# Patient Record
Sex: FEMALE | Race: WHITE | NOT HISPANIC OR LATINO | Employment: FULL TIME | URBAN - METROPOLITAN AREA
[De-identification: names, ages, dates, MRNs, and addresses within clinical notes are randomized per-mention and may not be internally consistent; named-entity substitution may affect disease eponyms.]

---

## 2017-08-01 ENCOUNTER — THERAPY VISIT (OUTPATIENT)
Dept: PHYSICAL THERAPY | Facility: CLINIC | Age: 47
End: 2017-08-01
Payer: COMMERCIAL

## 2017-08-01 DIAGNOSIS — M72.2 PLANTAR FASCIITIS: Primary | ICD-10-CM

## 2017-08-01 PROCEDURE — 97110 THERAPEUTIC EXERCISES: CPT | Mod: GP | Performed by: PHYSICAL THERAPIST

## 2017-08-01 PROCEDURE — 97161 PT EVAL LOW COMPLEX 20 MIN: CPT | Mod: GP | Performed by: PHYSICAL THERAPIST

## 2017-08-01 NOTE — PROGRESS NOTES
[facility] Initial Evaluation      Subjective:    Patient is a 46 year old female presenting with rehab right ankle/foot hpi.   Rand Macario is a 46 year old female with a right foot condition.  Condition occurred with:  Insidious onset.    This is a chronic condition  Pt reports 10 years of plantar fasciits which is usually managed with good footwear.  More recently 10 months ago (Oct 2016) it began to hurt more.  Started to ice, stretch but didn't help.  Three cortisone shots over the past 10 months help for a few weeks.  Recently stopped taking an arthritis medication that rx by the podiatrist for the pain (Meloxicam) and sx worsened.  Then recommended to try PT.  .    Patient reports pain:  Posterior.    Pain is described as aching, burning and sharp and is intermittent and reported as 8/10.  Associated symptoms:  Loss of motion/stiffness. Pain is the same all the time.  Symptoms are exacerbated by standing and walking (inactivity) and relieved by nothing.  Since onset symptoms are gradually worsening.  Special tests:  MRI (MRI basically negative, possibly some mild spurring).  Previous treatment includes other (stretching and icing, injections, walking boot).  There was mild and moderate improvement following previous treatment.  General health as reported by patient is fair.  Pertinent medical history includes:  Diabetes, high blood pressure and overweight.  Medical allergies: no.  Other surgeries include:  Other (hysterectomy).  Current medications:  Other and high blood pressure medication (cholesterol and metforming).  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  Patient is working in normal job without restrictions.  Primary job tasks include:  Prolonged sitting.    Barriers include:  None as reported by patient.    Red flags:  None as reported by patient.                        Objective:    Standing Alignment:                Ankle/Foot:  Pes planus L and pes planus R    Gait:       Deviations:  General Deviations:  Toe out R and stance time decr          Ankle/Foot Evaluation  ROM:    AROM:    Dorsiflexion:  Left:   13  Right:   2                    PALPATION: Palpation of ankle: R lateral calcaneus.                                                                                               Musculoskeletal:        Legs:      ROS    Assessment/Plan:      Patient is a 46 year old female with right side ankle complaints.    Patient has the following significant findings with corresponding treatment plan.                Diagnosis 1:  R plantar fasciitis/fasciosis  Pain -  manual therapy, self management, education, directional preference exercise and home program  Decreased ROM/flexibility - manual therapy, therapeutic exercise, therapeutic activity and home program  Decreased joint mobility - manual therapy, therapeutic exercise, therapeutic activity and home program  Inflammation - self management/home program  Decreased function - therapeutic activities and home program    Therapy Evaluation Codes:   1) History comprised of:   Personal factors that impact the plan of care:      Time since onset of symptoms.    Comorbidity factors that impact the plan of care are:      Overweight.     Medications impacting care: None.  2) Examination of Body Systems comprised of:   Body structures and functions that impact the plan of care:      Ankle.   Activity limitations that impact the plan of care are:      Standing and Walking.  3) Clinical presentation characteristics are:   Evolving/Changing.  4) Decision-Making    Low  Cumulative Therapy Evaluation is: Low complexity.    Previous and current functional limitations:  (See Goal Flow Sheet for this information)    Short term and Long term goals: (See Goal Flow Sheet for this information)     Communication ability:  Patient appears to be able to clearly communicate and understand verbal and written communication and follow directions  correctly.  Treatment Explanation - The following has been discussed with the patient:   RX ordered/plan of care  Anticipated outcomes  Possible risks and side effects  This patient would benefit from PT intervention to resume normal activities.   Rehab potential is good.    Frequency:  2 X week, once daily  Duration:  for 3 weeks  Discharge Plan:  Achieve all LTG.  Independent in home treatment program.  Reach maximal therapeutic benefit.    Please refer to the daily flowsheet for treatment today, total treatment time and time spent performing 1:1 timed codes.

## 2017-08-01 NOTE — LETTER
[facility]  [address]  [place] [address] [phone]    2017    Re: Rand Macario   :   1970  MRN:  [id number]   REFERRING PHYSICIAN:   Corinne Penny    [facility]  Date of Initial Evaluation:  2017  Visits:  Rxs Used: 1  Reason for Referral:  Plantar fasciitis  EVALUATION SUMMARY  [facility] Initial Evaluation  Subjective:  Patient is a 46 year old female presenting with rehab right ankle/foot hpi.   Rand Macario is a 46 year old female with a right foot condition.  Condition occurred with:  Insidious onset.    This is a chronic condition  Pt reports 10 years of plantar fasciits which is usually managed with good footwear.  More recently 10 months ago (Oct 2016) it began to hurt more.  Started to ice, stretch but didn't help.  Three cortisone shots over the past 10 months help for a few weeks.  Recently stopped taking an arthritis medication that rx by the podiatrist for the pain (Meloxicam) and sx worsened.  Then recommended to try PT.  .    Patient reports pain:  Posterior.    Pain is described as aching, burning and sharp and is intermittent and reported as 8/10.  Associated symptoms:  Loss of motion/stiffness. Pain is the same all the time.  Symptoms are exacerbated by standing and walking (inactivity) and relieved by nothing.  Since onset symptoms are gradually worsening.  Special tests:  MRI (MRI basically negative, possibly some mild spurring).  Previous treatment includes other (stretching and icing, injections, walking boot).  There was mild and moderate improvement following previous treatment.  General health as reported by patient is fair.  Pertinent medical history includes:  Diabetes, high blood pressure and overweight.  Medical allergies: no.  Other surgeries include:  Other (hysterectomy).  Current medications:  Other and high  blood pressure medication (cholesterol and metforming).  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  Patient is working in normal job without restrictions.  Primary job tasks include:  Prolonged sitting.  Barriers include:  None as reported by patient.  Red flags:  None as reported by patient.  Objective:  Standing Alignment:    Ankle/Foot:  Pes planus L and pes planus R  Gait:    Deviations:  General Deviations:  Toe out R and stance time decr  Ankle/Foot Evaluation  ROM:    AROM:    Dorsiflexion:  Left:   13  Right:   2  PALPATION: Palpation of ankle: R lateral calcaneus.                                       Musculoskeletal:        Legs:      Assessment/Plan:      Patient is a 46 year old female with right side ankle complaints.    Patient has the following significant findings with corresponding treatment plan.                Diagnosis 1:  R plantar fasciitis/fasciosis  Pain -  manual therapy, self management, education, directional preference exercise and home program  Decreased ROM/flexibility - manual therapy, therapeutic exercise, therapeutic activity and home program  Decreased joint mobility - manual therapy, therapeutic exercise, therapeutic activity and home program  Inflammation - self management/home program  Decreased function - therapeutic activities and home program    Therapy Evaluation Codes:   1) History comprised of:   Personal factors that impact the plan of care:      Time since onset of symptoms.    Comorbidity factors that impact the plan of care are:      Overweight.     Medications impacting care: None.  2) Examination of Body Systems comprised of:   Body structures and functions that impact the plan of care:      Ankle.   Activity limitations that impact the plan of care are:      Standing and Walking.  3) Clinical presentation characteristics are:   Evolving/Changing.  4) Decision-Making    Low  Cumulative Therapy Evaluation is: Low complexity.    Previous and current functional limitations:   (See Goal Flow Sheet for this information)    Short term and Long term goals: (See Goal Flow Sheet for this information)     Communication ability:  Patient appears to be able to clearly communicate and understand verbal and written communication and follow directions correctly.  Treatment Explanation - The following has been discussed with the patient:   RX ordered/plan of care  Anticipated outcomes  Possible risks and side effects  This patient would benefit from PT intervention to resume normal activities.   Rehab potential is good.    Frequency:  2 X week, once daily  Duration:  for 3 weeks  Discharge Plan:  Achieve all LTG.  Independent in home treatment program.  Reach maximal therapeutic benefit.    Thank you for your referral.    INQUIRIES  Therapist: Juwan Hong DPT  [facility] PHYSICAL THERAPY  [address] [place] [address]. [address]  [facility] [address]  Phone: [phone]  Fax: [phone]

## 2017-08-01 NOTE — MR AVS SNAPSHOT
After Visit Summary   8/1/2017    Rand Macario    MRN: [id number]           Patient Information     Date Of Birth          1970        Visit Information        Provider Department      8/1/2017 5:20 PM Juwan Hong, PT [facility] Physical [facility]        Today's Diagnoses     Plantar fasciitis    -  1       Follow-ups after your visit        Your next 10 appointments already scheduled     Aug 04, 2017  4:30 PM CDT   OKNG Extremity with Juwan Hong PT   [facility] Physical Therapy ([facility])    [address]  [facility] [address]   [phone]            Aug 08, 2017  4:00 PM CDT   KONG Extremity with Juwan Hong PT   [facility] Physical Therapy ([facility])    [address]  [facility] [address]   [phone]            Aug 10, 2017  3:40 PM CDT   KONG Extremity with Juwan Hong PT   [facility] Physical Therapy ([facility])    [address]  [facility] [address]   [phone]              Who to contact     If you have questions or need follow up information about today's clinic visit or your schedule please contact [facility] PHYSICAL [facility] directly at [phone].  Normal or non-critical lab and imaging results will be communicated to you by MyChart, letter or phone within 4 business days after the clinic has received the results. If you do not hear from us within 7 days, please contact the clinic through MyChart or phone. If you have a critical or abnormal lab result, we will notify you by phone as soon as possible.  Submit refill requests through Geo[employer] or call your pharmacy and they will forward the refill request to us. Please allow 3 business days for your refill to be completed.          Additional Information About Your  "Visit        Slime Sandwichhart Information     [employer] lets you send messages to your doctor, view your test results, renew your prescriptions, schedule appointments and more. To sign up, go to [email or web address] . Click on \"Log in\" on the left side of the screen, which will take you to the Welcome page. Then click on \"Sign up Now\" on the right side of the page.     You will be asked to enter the access code listed below, as well as some personal information. Please follow the directions to create your username and password.     Your access code is: [email or web address]  Expires: 10/30/2017  6:50 PM     Your access code will  in 90 days. If you need help or a new code, please call your [place] clinic or [phone].        Care EveryWhere ID     This is your Care EveryWhere ID. This could be used by other organizations to access your [place] medical records  [id number]         Blood Pressure from Last 3 Encounters:   No data found for BP    Weight from Last 3 Encounters:   No data found for Wt              We Performed the Following     HC PT EVAL, LOW COMPLEXITY     KONG INITIAL EVAL REPORT     THERAPEUTIC EXERCISES        Primary Care Provider    None Specified       No primary provider on file.        Equal Access to Services     CYNTHIA LOPEZ : Epi De La Rosa, domingo dyer, rosario michel, marie hicks. So [facility] [phone].    ATENCIÓN: Si habla español, tiene a asif disposición servicios gratuitos de asistencia lingüística. Llame al [phone].    We comply with applicable federal civil rights laws and [place] laws. We do not discriminate on the basis of race, color, national origin, age, disability sex, sexual orientation or gender identity.            Thank you!     Thank you for choosing [facility] PHYSICAL THERAPY  for your care. Our goal is always to provide you with excellent care. Hearing back from our patients is " one way we can continue to improve our services. Please take a few minutes to complete the written survey that you may receive in the mail after your visit with us. Thank you!             Your Updated Medication List - Protect others around you: Learn how to safely use, store and throw away your medicines at www.disposemymeds.org.      Notice  As of 8/1/2017  6:50 PM    You have not been prescribed any medications.

## 2017-08-04 ENCOUNTER — THERAPY VISIT (OUTPATIENT)
Dept: PHYSICAL THERAPY | Facility: CLINIC | Age: 47
End: 2017-08-04
Payer: COMMERCIAL

## 2017-08-04 DIAGNOSIS — M72.2 PLANTAR FASCIITIS: ICD-10-CM

## 2017-08-04 PROCEDURE — 97140 MANUAL THERAPY 1/> REGIONS: CPT | Mod: GP | Performed by: PHYSICAL THERAPIST

## 2017-08-04 PROCEDURE — 97110 THERAPEUTIC EXERCISES: CPT | Mod: GP | Performed by: PHYSICAL THERAPIST

## 2017-08-04 PROCEDURE — 97035 APP MDLTY 1+ULTRASOUND EA 15: CPT | Mod: GP | Performed by: PHYSICAL THERAPIST

## 2017-08-08 ENCOUNTER — THERAPY VISIT (OUTPATIENT)
Dept: PHYSICAL THERAPY | Facility: CLINIC | Age: 47
End: 2017-08-08
Payer: COMMERCIAL

## 2017-08-08 DIAGNOSIS — M72.2 PLANTAR FASCIITIS: ICD-10-CM

## 2017-08-08 PROCEDURE — 97140 MANUAL THERAPY 1/> REGIONS: CPT | Mod: GP | Performed by: PHYSICAL THERAPIST

## 2017-08-08 PROCEDURE — 97035 APP MDLTY 1+ULTRASOUND EA 15: CPT | Mod: GP | Performed by: PHYSICAL THERAPIST

## 2017-08-08 NOTE — PROGRESS NOTES
Subjective:    HPI                    Objective:    System    Physical Exam    General     ROS    Assessment/Plan:      SUBJECTIVE  Subjective changes as noted by pt:  Day after last session was best day she's had in year.  Continues her perform her stretches.       Current pain level: 5/10     Changes in function:  None     Adverse reaction to treatment or activity:  None    OBJECTIVE  Changes in objective findings:  Yes, R ankle DF AROM 12 deg.          ASSESSMENT  Rand continues to require intervention to meet STG and LTG's: PT  Patient is progressing as expected.  Response to therapy has shown an improvement in  pain level  Progress made towards STG/LTG?  None    PLAN  Continue current treatment plan until patient demonstrates readiness to progress to higher level exercises.    PTA/ATC plan:  N/A    Please refer to the daily flowsheet for treatment today, total treatment time and time spent performing 1:1 timed codes.

## 2017-08-08 NOTE — MR AVS SNAPSHOT
After Visit Summary   8/8/2017    Rand Macario    MRN: [id number]           Patient Information     Date Of Birth          1970        Visit Information        Provider Department      8/8/2017 4:00 PM Juwan Hong, PT [facility]        Today's Diagnoses     Plantar fasciitis           Follow-ups after your visit        Your next 10 appointments already scheduled     Aug 10, 2017  3:40 PM CDT   KONG Extremity with Juwan Hong PT   [facility] ([facility])    [address]  [facility] [address]   [phone]            Aug 15, 2017  4:00 PM CDT   KONG Extremity with Juwan Hong PT   [facility] ([facility])    [address]  [facility] [address]   [phone]            Aug 18, 2017  3:50 PM CDT   KONG Extremity with Juwan Hong PT   [facility] ([facility])    [address]  [facility] [address]   [phone]              Who to contact     If you have questions or need follow up information about today's clinic visit or your schedule please contact [facility] PHYSICAL [facility] directly at [phone].  Normal or non-critical lab and imaging results will be communicated to you by MyChart, letter or phone within 4 business days after the clinic has received the results. If you do not hear from us within 7 days, please contact the clinic through MyChart or phone. If you have a critical or abnormal lab result, we will notify you by phone as soon as possible.  Submit refill requests through [employer] or call your pharmacy and they will forward the refill request to us. Please allow 3 business days for your refill to be completed.          Additional Information About Your  "Visit        Caller[employer]hart Information     [employer] lets you send messages to your doctor, view your test results, renew your prescriptions, schedule appointments and more. To sign up, go to [email or web address] . Click on \"Log in\" on the left side of the screen, which will take you to the Welcome page. Then click on \"Sign up Now\" on the right side of the page.     You will be asked to enter the access code listed below, as well as some personal information. Please follow the directions to create your username and password.     Your access code is: [email or web address]  Expires: 10/30/2017  6:50 PM     Your access code will  in 90 days. If you need help or a new code, please call your [place] clinic or [phone].        Care EveryWhere ID     This is your Care EveryWhere ID. This could be used by other organizations to access your [place] medical records  [id number]         Blood Pressure from Last 3 Encounters:   No data found for BP    Weight from Last 3 Encounters:   No data found for Wt              We Performed the Following     MANUAL THER TECH,1+REGIONS,EA 15 MIN     ULTRASOUND THERAPY        Primary Care Provider    None Specified       No primary provider on file.        Equal Access to Services     ANGEL LOPEZ : Hadii hakan De La Rosa, wajodee dyer, qaaleksey kaalmada anand, marie cohen . So [facility] [place] [facility] [phone].    ATENCIÓN: Si habla español, tiene a asif disposición servicios gratuitos de asistencia lingüística. Llame al [phone].    We comply with applicable federal civil rights laws and [place] laws. We do not discriminate on the basis of race, color, national origin, age, disability sex, sexual orientation or gender identity.            Thank you!     Thank you for choosing [facility] PHYSICAL THERAPY  for your care. Our goal is always to provide you with excellent care. Hearing back from our patients is one way we can " continue to improve our services. Please take a few minutes to complete the written survey that you may receive in the mail after your visit with us. Thank you!             Your Updated Medication List - Protect others around you: Learn how to safely use, store and throw away your medicines at www.disposemymeds.org.      Notice  As of 8/8/2017  4:41 PM    You have not been prescribed any medications.

## 2017-08-10 ENCOUNTER — THERAPY VISIT (OUTPATIENT)
Dept: PHYSICAL THERAPY | Facility: CLINIC | Age: 47
End: 2017-08-10
Payer: COMMERCIAL

## 2017-08-10 DIAGNOSIS — M72.2 PLANTAR FASCIITIS: ICD-10-CM

## 2017-08-10 PROCEDURE — 97035 APP MDLTY 1+ULTRASOUND EA 15: CPT | Mod: GP | Performed by: PHYSICAL THERAPIST

## 2017-08-10 PROCEDURE — 97140 MANUAL THERAPY 1/> REGIONS: CPT | Mod: GP | Performed by: PHYSICAL THERAPIST

## 2017-08-15 ENCOUNTER — THERAPY VISIT (OUTPATIENT)
Dept: PHYSICAL THERAPY | Facility: CLINIC | Age: 47
End: 2017-08-15
Payer: COMMERCIAL

## 2017-08-15 DIAGNOSIS — M72.2 PLANTAR FASCIITIS: ICD-10-CM

## 2017-08-15 PROCEDURE — 97140 MANUAL THERAPY 1/> REGIONS: CPT | Mod: GP | Performed by: PHYSICAL THERAPIST

## 2017-08-15 PROCEDURE — 97035 APP MDLTY 1+ULTRASOUND EA 15: CPT | Mod: GP | Performed by: PHYSICAL THERAPIST

## 2017-08-15 NOTE — MR AVS SNAPSHOT
"              After Visit Summary   8/15/2017    Rand Macario    MRN: [id number]           Patient Information     Date Of Birth          1970        Visit Information        Provider Department      8/15/2017 4:00 PM Ju[facility], [facility]        Today's Diagnoses     Plantar fasciitis           Follow-ups after your visit        Your next 10 appointments already scheduled     Aug 18, 2017  3:50 PM CDT   KONG Extremity with Juwan Hong PT   [facility] Physical Therapy ([facility])    [address]  [facility] [address] [phone]              Who to contact     If you have questions or need follow up information about today's clinic visit or your schedule please contact [facility] PHYSICAL [facility] directly at [phone].  Normal or non-critical lab and imaging results will be communicated to you by Clowdyhart, letter or phone within 4 business days after the clinic has received the results. If you do not hear from us within 7 days, please contact the clinic through Clowdyhart or phone. If you have a critical or abnormal lab result, we will notify you by phone as soon as possible.  Submit refill requests through Yobongo or call your pharmacy and they will forward the refill request to us. Please allow 3 business days for your refill to be completed.          Additional Information About Your Visit        MyChart Information     Yobongo lets you send messages to your doctor, view your test results, renew your prescriptions, schedule appointments and more. To sign up, go to [email or web address] . Click on \"Log in\" on the left side of the screen, which will take you to the Welcome page. Then click on \"Sign up Now\" on the right side of the page.     You will be asked to enter the access code listed below, as well as some personal information. Please " follow the directions to create your username and password.     Your access code is: [email or web address]  Expires: 10/30/2017  6:50 PM     Your access code will  in 90 days. If you need help or a new code, please call your [place] clinic or [phone].        Care EveryWhere ID     This is your Care EveryWhere ID. This could be used by other organizations to access your [place] medical records  [id number]         Blood Pressure from Last 3 Encounters:   No data found for BP    Weight from Last 3 Encounters:   No data found for Wt              We Performed the Following     MANUAL THER TECH,1+REGIONS,EA 15 MIN     ULTRASOUND THERAPY        Primary Care Provider    None Specified       No primary provider on file.        Equal Access to Services     ANGEL [facility] : Epi De La Rosa, domingo dyer, rosario michel, marie cohen . So [facility] [phone].    ATENCIÓN: Si habla español, tiene a asif disposición servicios gratuitos de asistencia lingüística. Llame al [phone].    We comply with applicable federal civil rights laws and [place] laws. We do not discriminate on the basis of race, color, national origin, age, disability sex, sexual orientation or gender identity.            Thank you!     Thank you for choosing [facility] PHYSICAL THERAPY  for your care. Our goal is always to provide you with excellent care. Hearing back from our patients is one way we can continue to improve our services. Please take a few minutes to complete the written survey that you may receive in the mail after your visit with us. Thank you!             Your Updated Medication List - Protect others around you: Learn how to safely use, store and throw away your medicines at www.disposemymeds.org.      Notice  As of 8/15/2017  4:39 PM    You have not been prescribed any medications.

## 2017-08-18 ENCOUNTER — THERAPY VISIT (OUTPATIENT)
Dept: PHYSICAL THERAPY | Facility: CLINIC | Age: 47
End: 2017-08-18
Payer: COMMERCIAL

## 2017-08-18 DIAGNOSIS — M72.2 PLANTAR FASCIITIS: ICD-10-CM

## 2017-08-18 PROCEDURE — 97035 APP MDLTY 1+ULTRASOUND EA 15: CPT | Mod: GP | Performed by: PHYSICAL THERAPIST

## 2017-08-18 PROCEDURE — 97140 MANUAL THERAPY 1/> REGIONS: CPT | Mod: GP | Performed by: PHYSICAL THERAPIST

## 2017-08-21 ENCOUNTER — THERAPY VISIT (OUTPATIENT)
Dept: PHYSICAL THERAPY | Facility: CLINIC | Age: 47
End: 2017-08-21
Payer: COMMERCIAL

## 2017-08-21 DIAGNOSIS — M72.2 PLANTAR FASCIITIS: ICD-10-CM

## 2017-08-21 PROCEDURE — 97035 APP MDLTY 1+ULTRASOUND EA 15: CPT | Mod: GP | Performed by: PHYSICAL THERAPIST

## 2017-08-21 PROCEDURE — 97140 MANUAL THERAPY 1/> REGIONS: CPT | Mod: GP | Performed by: PHYSICAL THERAPIST

## 2017-08-21 NOTE — MR AVS SNAPSHOT
"              After Visit Summary   8/21/2017    Rand Macario    MRN: [id number]           Patient Information     Date Of Birth          1970        Visit Information        Provider Department      8/21/2017 9:30 AM Juwan Hong, PT [facility] Physical Therapy        Today's Diagnoses     Plantar fasciitis           Follow-ups after your visit        Your next 10 appointments already scheduled     Aug 25, 2017 10:20 AM CDT   KONG Extremity with Juwan Hong PT   [facility] Physical Therapy ([facility])    [address]  [facility] [id number] [phone]              Who to contact     If you have questions or need follow up information about today's clinic visit or your schedule please contact [facility] PHYSICAL [facility] directly at [phone].  Normal or non-critical lab and imaging results will be communicated to you by WiLinxhart, letter or phone within 4 business days after the clinic has received the results. If you do not hear from us within 7 days, please contact the clinic through WiLinxhart or phone. If you have a critical or abnormal lab result, we will notify you by phone as soon as possible.  Submit refill requests through Fancorps or call your pharmacy and they will forward the refill request to us. Please allow 3 business days for your refill to be completed.          Additional Information About Your Visit        MyChart Information     Fancorps lets you send messages to your doctor, view your test results, renew your prescriptions, schedule appointments and more. To sign up, go to [email or web address] . Click on \"Log in\" on the left side of the screen, which will take you to the Welcome page. Then click on \"Sign up Now\" on the right side of the page.     You will be asked to enter the access code listed below, as well as some personal information. Please " follow the directions to create your username and password.     Your access code is: [email or web address]  Expires: 10/30/2017  6:50 PM     Your access code will  in 90 days. If you need help or a new code, please call your [place] clinic or [phone].        Care EveryWhere ID     This is your Care EveryWhere ID. This could be used by other organizations to access your [place] medical records  [id number]         Blood Pressure from Last 3 Encounters:   No data found for BP    Weight from Last 3 Encounters:   No data found for Wt              We Performed the Following     MANUAL THER TECH,1+REGIONS,EA 15 MIN     ULTRASOUND THERAPY        Primary Care Provider    None Specified       No primary provider on file.        Equal Access to Services     [facility]EL [facility] : Epi De La Rosa, domingo dyer, rosario michel, marie cohen . So [facility] [phone].    ATENCIÓN: Si habla español, tiene a asif disposición servicios gratuitos de asistencia lingüística. Llame al [phone].    We comply with applicable federal civil rights laws and [place] laws. We do not discriminate on the basis of race, color, national origin, age, disability sex, sexual orientation or gender identity.            Thank you!     Thank you for choosing [facility] PHYSICAL THERAPY  for your care. Our goal is always to provide you with excellent care. Hearing back from our patients is one way we can continue to improve our services. Please take a few minutes to complete the written survey that you may receive in the mail after your visit with us. Thank you!             Your Updated Medication List - Protect others around you: Learn how to safely use, store and throw away your medicines at www.disposemymeds.org.      Notice  As of 2017 10:07 AM    You have not been prescribed any medications.

## 2017-08-21 NOTE — PROGRESS NOTES
Subjective:    HPI                    Objective:    System    Physical Exam    General     ROS    Assessment/Plan:      SUBJECTIVE  Subjective changes as noted by pt:  Feels better with night splint but only comfortable for half the night.  Still on right track. Overall 75% to where she wants to be.        Current pain level: 5/10     Changes in function:  None     Adverse reaction to treatment or activity:  None    OBJECTIVE  Changes in objective findings:  None.          ASSESSMENT  Rand continues to require intervention to meet STG and LTG's: PT  Patient is progressing as expected.  Response to therapy has shown an improvement in  pain level  Progress made towards STG/LTG?  None    PLAN  Continue current treatment plan until patient demonstrates readiness to progress to higher level exercises.    PTA/ATC plan:  N/A    Please refer to the daily flowsheet for treatment today, total treatment time and time spent performing 1:1 timed codes.

## 2017-08-25 ENCOUNTER — THERAPY VISIT (OUTPATIENT)
Dept: PHYSICAL THERAPY | Facility: CLINIC | Age: 47
End: 2017-08-25
Payer: COMMERCIAL

## 2017-08-25 DIAGNOSIS — M72.2 PLANTAR FASCIITIS: ICD-10-CM

## 2017-08-25 PROCEDURE — 97035 APP MDLTY 1+ULTRASOUND EA 15: CPT | Mod: GP | Performed by: PHYSICAL THERAPIST

## 2017-08-25 PROCEDURE — 97140 MANUAL THERAPY 1/> REGIONS: CPT | Mod: GP | Performed by: PHYSICAL THERAPIST

## 2017-08-31 ENCOUNTER — THERAPY VISIT (OUTPATIENT)
Dept: PHYSICAL THERAPY | Facility: CLINIC | Age: 47
End: 2017-08-31
Payer: COMMERCIAL

## 2017-08-31 DIAGNOSIS — M72.2 PLANTAR FASCIITIS: ICD-10-CM

## 2017-08-31 PROCEDURE — 97140 MANUAL THERAPY 1/> REGIONS: CPT | Mod: GP | Performed by: PHYSICAL THERAPIST

## 2017-08-31 PROCEDURE — 97035 APP MDLTY 1+ULTRASOUND EA 15: CPT | Mod: GP | Performed by: PHYSICAL THERAPIST

## 2017-08-31 NOTE — PROGRESS NOTES
Subjective:    HPI                    Objective:    System    Physical Exam    General     ROS    Assessment/Plan:      SUBJECTIVE  Subjective changes as noted by pt:  Pretty sore today, [place].  Not sure if that's why.   Wearing boot about 4-5 hrs/night.      Current pain level: 8/10     Changes in function:  None     Adverse reaction to treatment or activity:  None    OBJECTIVE  Changes in objective findings:  Yes, R ankle DF AROM 9        ASSESSMENT  Rand continues to require intervention to meet STG and LTG's: PT  Patient has experienced an exacerbation of symptoms.  Response to therapy has shown a worsening of  pain level  Progress made towards STG/LTG?  None    PLAN  Continue current treatment plan until patient demonstrates readiness to progress to higher level exercises.    PTA/ATC plan:  N/A    Please refer to the daily flowsheet for treatment today, total treatment time and time spent performing 1:1 timed codes.

## 2017-08-31 NOTE — MR AVS SNAPSHOT
"              After Visit Summary   8/31/2017    Rand Macario    MRN: [id number]           Patient Information     Date Of Birth          1970        Visit Information        Provider Department      8/31/2017 3:40 PM [facility]        Today's Diagnoses     Plantar fasciitis           Follow-ups after your visit        Your next 10 appointments already scheduled     Sep 05, 2017  9:50 AM CDT   KONG Extremity with Juwan Hong PT   [facility] ([facility])    [address]  [facility] [id number] [phone]            Sep 08, 2017  9:40 AM CDT   KONG Extremity with Juwan Hong PT   [facility] ([facility])    [address]  [facility] [id number] [phone]              Who to contact     If you have questions or need follow up information about today's clinic visit or your schedule please contact [facility] PHYSICAL THERAPY directly at [phone].  Normal or non-critical lab and imaging results will be communicated to you by MyChart, letter or phone within 4 business days after the clinic has received the results. If you do not hear from us within 7 days, please contact the clinic through Acoriohart or phone. If you have a critical or abnormal lab result, we will notify you by phone as soon as possible.  Submit refill requests through Glympse or call your pharmacy and they will forward the refill request to us. Please allow 3 business days for your refill to be completed.          Additional Information About Your Visit        MyChart Information     Glympse lets you send messages to your doctor, view your test results, renew your prescriptions, schedule appointments and more. To sign up, go to [email or web address] . Click on \"Log in\" on the " "left side of the screen, which will take you to the Welcome page. Then click on \"Sign up Now\" on the right side of the page.     You will be asked to enter the access code listed below, as well as some personal information. Please follow the directions to create your username and password.     Your access code is: [email or web address]  Expires: 10/30/2017  6:50 PM     Your access code will  in 90 days. If you need help or a new code, please call your [place] clinic or [phone].        Care EveryWhere ID     This is your Care EveryWhere ID. This could be used by other organizations to access your [place] medical records  [id number]         Blood Pressure from Last 3 Encounters:   No data found for BP    Weight from Last 3 Encounters:   No data found for Wt              We Performed the Following     MANUAL THER TECH,1+REGIONS,EA 15 MIN     ULTRASOUND THERAPY        Primary Care Provider    None Specified       No primary provider on file.        Equal Access to Services     [facility]EL [facility] : Hadii hakan De La Rosa, wajodee dyer, qaaleksey kaalmavinay michel, marie cohen . So [facility] [phone].    ATENCIÓN: Si snehalla jonatanañol, tiene a asif disposición servicios gratuitos de asistencia lingüística. Llame al [phone].    We comply with applicable federal civil rights laws and [place] laws. We do not discriminate on the basis of race, color, national origin, age, disability sex, sexual orientation or gender identity.            Thank you!     Thank you for choosing [facility] PHYSICAL THERAPY  for your care. Our goal is always to provide you with excellent care. Hearing back from our patients is one way we can continue to improve our services. Please take a few minutes to complete the written survey that you may receive in the mail after your visit with us. Thank you!             Your Updated Medication List - Protect others around you: Learn how " to safely use, store and throw away your medicines at www.disposemymeds.org.      Notice  As of 8/31/2017  4:22 PM    You have not been prescribed any medications.

## 2017-09-05 ENCOUNTER — THERAPY VISIT (OUTPATIENT)
Dept: PHYSICAL THERAPY | Facility: CLINIC | Age: 47
End: 2017-09-05
Payer: COMMERCIAL

## 2017-09-05 DIAGNOSIS — M72.2 PLANTAR FASCIITIS: ICD-10-CM

## 2017-09-05 PROCEDURE — 97140 MANUAL THERAPY 1/> REGIONS: CPT | Mod: GP | Performed by: PHYSICAL THERAPIST

## 2017-09-05 PROCEDURE — 97035 APP MDLTY 1+ULTRASOUND EA 15: CPT | Mod: GP | Performed by: PHYSICAL THERAPIST

## 2017-09-08 ENCOUNTER — THERAPY VISIT (OUTPATIENT)
Dept: PHYSICAL THERAPY | Facility: CLINIC | Age: 47
End: 2017-09-08
Payer: COMMERCIAL

## 2017-09-08 DIAGNOSIS — M72.2 PLANTAR FASCIITIS: ICD-10-CM

## 2017-09-08 PROCEDURE — 97140 MANUAL THERAPY 1/> REGIONS: CPT | Mod: GP | Performed by: PHYSICAL THERAPIST

## 2017-09-08 PROCEDURE — 97035 APP MDLTY 1+ULTRASOUND EA 15: CPT | Mod: GP | Performed by: PHYSICAL THERAPIST

## 2017-09-08 NOTE — MR AVS SNAPSHOT
"              After Visit Summary   9/8/2017    Rand Macario    MRN: [id number]           Patient Information     Date Of Birth          1970        Visit Information        Provider Department      9/8/2017 9:40 AM Juwan Hong, PT [facility] Physical [facility]        Today's Diagnoses     Plantar fasciitis           Follow-ups after your visit        Your next 10 appointments already scheduled     Sep 12, 2017  4:00 PM CDT   KONG Extremity with Juwan Hong PT   [facility] Physical Therapy ([facility])    [address]  [facility] [id number] [phone]            Sep 15, 2017  3:50 PM CDT   KONG Extremity with Juwan Hong PT   [facility] Physical Therapy ([facility])    [address]  [facility] [address] [phone]              Who to contact     If you have questions or need follow up information about today's clinic visit or your schedule please contact [facility] PHYSICAL THERAPY directly at [phone].  Normal or non-critical lab and imaging results will be communicated to you by MyChart, letter or phone within 4 business days after the clinic has received the results. If you do not hear from us within 7 days, please contact the clinic through Social GameWorkshart or phone. If you have a critical or abnormal lab result, we will notify you by phone as soon as possible.  Submit refill requests through ReVision Optics or call your pharmacy and they will forward the refill request to us. Please allow 3 business days for your refill to be completed.          Additional Information About Your Visit        MyChart Information     ReVision Optics lets you send messages to your doctor, view your test results, renew your prescriptions, schedule appointments and more. To sign up, go to [email or web address] . Click on \"Log in\" on the " "left side of the screen, which will take you to the Welcome page. Then click on \"Sign up Now\" on the right side of the page.     You will be asked to enter the access code listed below, as well as some personal information. Please follow the directions to create your username and password.     Your access code is: [email or web address]  Expires: 10/30/2017  6:50 PM     Your access code will  in 90 days. If you need help or a new code, please call your [place] clinic or [phone].        Care EveryWhere ID     This is your Care EveryWhere ID. This could be used by other organizations to access your [place] medical records  [id number]         Blood Pressure from Last 3 Encounters:   No data found for BP    Weight from Last 3 Encounters:   No data found for Wt              We Performed the Following     MANUAL THER TECH,1+REGIONS,EA 15 MIN     ULTRASOUND THERAPY        Primary Care Provider    None Specified       No primary provider on file.        Equal Access to Services     [facility]EL [facility] : Hadii hakan De La Rosa, wajodee dyer, qaaleksey kaalmavinay michel, marie cohen . So [facility] [phone].    ATENCIÓN: Si snehalla jonatanañol, tiene a asif disposición servicios gratuitos de asistencia lingüística. Llame al [phone].    We comply with applicable federal civil rights laws and [place] laws. We do not discriminate on the basis of race, color, national origin, age, disability sex, sexual orientation or gender identity.            Thank you!     Thank you for choosing [facility] PHYSICAL THERAPY  for your care. Our goal is always to provide you with excellent care. Hearing back from our patients is one way we can continue to improve our services. Please take a few minutes to complete the written survey that you may receive in the mail after your visit with us. Thank you!             Your Updated Medication List - Protect others around you: Learn how " to safely use, store and throw away your medicines at www.disposemymeds.org.      Notice  As of 9/8/2017 10:19 AM    You have not been prescribed any medications.

## 2017-09-08 NOTE — PROGRESS NOTES
Subjective:    HPI                    Objective:    System    Physical Exam    General     ROS    Assessment/Plan:      SUBJECTIVE  Subjective changes as noted by pt:  Rand reports that she is 75-80% to where she wants to be.  If she walks more than 20-30 min she gets increased pain.       Current pain level: 4/10     Changes in function:  None     Adverse reaction to treatment or activity:  None    OBJECTIVE  Changes in objective findings:  Yes, R ankle DF AROM 9 deg.           ASSESSMENT  Rand continues to require intervention to meet STG and LTG's: PT  Patient is progressing as expected.  Response to therapy has shown an improvement in  pain level  Progress made towards STG/LTG?  None    PLAN  Continue current treatment plan until patient demonstrates readiness to progress to higher level exercises.    PTA/ATC plan:  N/A    Please refer to the daily flowsheet for treatment today, total treatment time and time spent performing 1:1 timed codes.

## 2017-09-12 ENCOUNTER — THERAPY VISIT (OUTPATIENT)
Dept: PHYSICAL THERAPY | Facility: CLINIC | Age: 47
End: 2017-09-12
Payer: COMMERCIAL

## 2017-09-12 DIAGNOSIS — M72.2 PLANTAR FASCIITIS: ICD-10-CM

## 2017-09-12 PROCEDURE — 97035 APP MDLTY 1+ULTRASOUND EA 15: CPT | Mod: GP | Performed by: PHYSICAL THERAPIST

## 2017-09-12 PROCEDURE — 97140 MANUAL THERAPY 1/> REGIONS: CPT | Mod: GP | Performed by: PHYSICAL THERAPIST

## 2017-09-15 ENCOUNTER — THERAPY VISIT (OUTPATIENT)
Dept: PHYSICAL THERAPY | Facility: CLINIC | Age: 47
End: 2017-09-15
Payer: COMMERCIAL

## 2017-09-15 DIAGNOSIS — M72.2 PLANTAR FASCIITIS: ICD-10-CM

## 2017-09-15 PROCEDURE — 97035 APP MDLTY 1+ULTRASOUND EA 15: CPT | Mod: GP | Performed by: PHYSICAL THERAPIST

## 2017-09-15 PROCEDURE — 97140 MANUAL THERAPY 1/> REGIONS: CPT | Mod: GP | Performed by: PHYSICAL THERAPIST

## 2017-09-15 NOTE — PROGRESS NOTES
Subjective:    HPI                    Objective:    System    Physical Exam    General     ROS    Assessment/Plan:      SUBJECTIVE  Subjective changes as noted by pt:  About the same as last time, feels overall slow progressive improvements. Notices it most in the morning, can wear boot for about 3-4 hrs a night. Begins to bother within 10 minutes of walking, but not severe.       Current pain level: 5/10     Changes in function:  None     Adverse reaction to treatment or activity:  None    OBJECTIVE  Changes in objective findings:  Yes, tender upon palpation of R medial calcaneus.         ASSESSMENT  Rand continues to require intervention to meet STG and LTG's: PT  Patient is progressing as expected.  Response to therapy has shown lack of progress in  pain level  Progress made towards STG/LTG?  None    PLAN  Continue current treatment plan until patient demonstrates readiness to progress to higher level exercises.    PTA/ATC plan:  N/A    Please refer to the daily flowsheet for treatment today, total treatment time and time spent performing 1:1 timed codes.

## 2017-09-15 NOTE — MR AVS SNAPSHOT
"              After Visit Summary   9/15/2017    Rand Macario    MRN: [id number]           Patient Information     Date Of Birth          1970        Visit Information        Provider Department      9/15/2017 9:40 AM Juwan Hong, PT [facility] Physical [facility]        Today's Diagnoses     Plantar fasciitis           Follow-ups after your visit        Your next 10 appointments already scheduled     Sep 21, 2017  3:00 PM CDT   KONG Extremity with Juwan Hong PT   [facility] Physical Therapy ([facility])    [address]  [facility] [address] [phone]            Sep 28, 2017  4:20 PM CDT   KONG Extremity with Juwan Hong PT   [facility] Physical Therapy ([facility])    [address]  [facility] [address] [phone]              Who to contact     If you have questions or need follow up information about today's clinic visit or your schedule please contact [facility] PHYSICAL THERAPY directly at [phone].  Normal or non-critical lab and imaging results will be communicated to you by MyChart, letter or phone within 4 business days after the clinic has received the results. If you do not hear from us within 7 days, please contact the clinic through Aditivehart or phone. If you have a critical or abnormal lab result, we will notify you by phone as soon as possible.  Submit refill requests through GeoTrac or call your pharmacy and they will forward the refill request to us. Please allow 3 business days for your refill to be completed.          Additional Information About Your Visit        MyChart Information     GeoTrac lets you send messages to your doctor, view your test results, renew your prescriptions, schedule appointments and more. To sign up, go to [email or web address] . Click on \"Log in\" on the " "left side of the screen, which will take you to the Welcome page. Then click on \"Sign up Now\" on the right side of the page.     You will be asked to enter the access code listed below, as well as some personal information. Please follow the directions to create your username and password.     Your access code is: [email or web address]  Expires: 10/30/2017  6:50 PM     Your access code will  in 90 days. If you need help or a new code, please call your [place] clinic or [phone].        Care EveryWhere ID     This is your Care EveryWhere ID. This could be used by other organizations to access your [place] medical records  [id number]         Blood Pressure from Last 3 Encounters:   No data found for BP    Weight from Last 3 Encounters:   No data found for Wt              We Performed the Following     MANUAL THER TECH,1+REGIONS,EA 15 MIN     ULTRASOUND THERAPY        Primary Care Provider    None Specified       No primary provider on file.        Equal Access to Services     ANGEL [facility] : Hadii hakan De La Rosa, wajodee dyer, qaaleksey kaalmavinay michel, marie cohen . So [facility] [phone].    ATENCIÓN: Si snehalla jonatanañol, tiene a asif disposición servicios gratuitos de asistencia lingüística. Llame al [phone].    We comply with applicable federal civil rights laws and [place] laws. We do not discriminate on the basis of race, color, national origin, age, disability sex, sexual orientation or gender identity.            Thank you!     Thank you for choosing [facility] PHYSICAL THERAPY  for your care. Our goal is always to provide you with excellent care. Hearing back from our patients is one way we can continue to improve our services. Please take a few minutes to complete the written survey that you may receive in the mail after your visit with us. Thank you!             Your Updated Medication List - Protect others around you: Learn how " to safely use, store and throw away your medicines at www.disposemymeds.org.      Notice  As of 9/15/2017 10:18 AM    You have not been prescribed any medications.

## 2017-09-21 ENCOUNTER — THERAPY VISIT (OUTPATIENT)
Dept: PHYSICAL THERAPY | Facility: CLINIC | Age: 47
End: 2017-09-21
Payer: COMMERCIAL

## 2017-09-21 DIAGNOSIS — M72.2 PLANTAR FASCIITIS: ICD-10-CM

## 2017-09-21 PROCEDURE — 97035 APP MDLTY 1+ULTRASOUND EA 15: CPT | Mod: GP | Performed by: PHYSICAL THERAPIST

## 2017-09-21 PROCEDURE — 97140 MANUAL THERAPY 1/> REGIONS: CPT | Mod: GP | Performed by: PHYSICAL THERAPIST

## 2017-09-21 NOTE — PROGRESS NOTES
Subjective:    HPI                    Objective:    System    Physical Exam    General     ROS    Assessment/Plan:      SUBJECTIVE  Subjective changes as noted by pt:  Felt ok not doing two sessions/wk this week. Having some times where she isn't noticing her pain as much with walking.  Continues night splint, but it's difficult to use so ordered a different option.      Current pain level: 4/10     Changes in function:  None     Adverse reaction to treatment or activity:  None    OBJECTIVE  Changes in objective findings:  Yes, none        ASSESSMENT  Rand continues to require intervention to meet STG and LTG's: PT  Patient is progressing as expected.  Response to therapy has shown an improvement in  pain level  Progress made towards STG/LTG?  None    PLAN  Continue current treatment plan until patient demonstrates readiness to progress to higher level exercises.    PTA/ATC plan:  N/A    Please refer to the daily flowsheet for treatment today, total treatment time and time spent performing 1:1 timed codes.

## 2017-09-21 NOTE — MR AVS SNAPSHOT
"              After Visit Summary   9/21/2017    Rand Macario    MRN: [id number]           Patient Information     Date Of Birth          1970        Visit Information        Provider Department      9/21/2017 3:00 PM Juwan Hong, PT [facility] Physical [facility]        Today's Diagnoses     Plantar fasciitis           Follow-ups after your visit        Your next 10 appointments already scheduled     Sep 28, 2017  4:20 PM CDT   KONG Extremity with Juwan Hong PT   [facility] Physical Therapy ([facility])    [address]  [facility] [id number] [phone]            Oct 05, 2017  4:20 PM CDT   KONG Extremity with Juwan Hong PT   [facility] Physical Therapy ([facility])    [address]  [facility] [id number] [phone]              Who to contact     If you have questions or need follow up information about today's clinic visit or your schedule please contact [facility] PHYSICAL THERAPY directly at [phone].  Normal or non-critical lab and imaging results will be communicated to you by MyChart, letter or phone within 4 business days after the clinic has received the results. If you do not hear from us within 7 days, please contact the clinic through Geewahart or phone. If you have a critical or abnormal lab result, we will notify you by phone as soon as possible.  Submit refill requests through [employer] or call your pharmacy and they will forward the refill request to us. Please allow 3 business days for your refill to be completed.          Additional Information About Your Visit        MyChart Information     [employer] lets you send messages to your doctor, view your test results, renew your prescriptions, schedule appointments and more. To sign up, go to [email or web address] . Click on \"Log in\" on the " "left side of the screen, which will take you to the Welcome page. Then click on \"Sign up Now\" on the right side of the page.     You will be asked to enter the access code listed below, as well as some personal information. Please follow the directions to create your username and password.     Your access code is: [email or web address]  Expires: 10/30/2017  6:50 PM     Your access code will  in 90 days. If you need help or a new code, please call your [place] clinic or [phone].        Care EveryWhere ID     This is your Care EveryWhere ID. This could be used by other organizations to access your [place] medical records  [id number]         Blood Pressure from Last 3 Encounters:   No data found for BP    Weight from Last 3 Encounters:   No data found for Wt              We Performed the Following     MANUAL THER TECH,1+REGIONS,EA 15 MIN     ULTRASOUND THERAPY        Primary Care Provider    None Specified       No primary provider on file.        Equal Access to Services     [facility] : Hadii hakan De La Rosa, wajodee dyer, qaaleksey kaalmavinay michel, marie cohen . So [facility] [phone].    ATENCIÓN: Si snehalla jonatanañol, tiene a asif disposición servicios gratuitos de asistencia lingüística. Llame al [phone].    We comply with applicable federal civil rights laws and [place] laws. We do not discriminate on the basis of race, color, national origin, age, disability sex, sexual orientation or gender identity.            Thank you!     Thank you for choosing [facility] PH[facility] THERAP[facility]  for your care. Our goal is always to provide you with excellent care. Hearing back from our patients is one way we can continue to improve our services. Please take a few minutes to complete the written survey that you may receive in the mail after your visit with us. Thank you!             Your Updated Medication List - Protect others around you: Learn how " to safely use, store and throw away your medicines at www.disposemymeds.org.      Notice  As of 9/21/2017  3:41 PM    You have not been prescribed any medications.

## 2017-09-28 ENCOUNTER — THERAPY VISIT (OUTPATIENT)
Dept: PHYSICAL THERAPY | Facility: CLINIC | Age: 47
End: 2017-09-28
Payer: COMMERCIAL

## 2017-09-28 DIAGNOSIS — M72.2 PLANTAR FASCIITIS: ICD-10-CM

## 2017-09-28 PROCEDURE — 97035 APP MDLTY 1+ULTRASOUND EA 15: CPT | Mod: GP | Performed by: PHYSICAL THERAPIST

## 2017-09-28 PROCEDURE — 97140 MANUAL THERAPY 1/> REGIONS: CPT | Mod: GP | Performed by: PHYSICAL THERAPIST

## 2017-09-28 NOTE — PROGRESS NOTES
Subjective:    HPI                    Objective:    System    Physical Exam    General     ROS    Assessment/Plan:      SUBJECTIVE  Subjective changes as noted by pt:  No worse, not any gains since last session. 70-80% to where she wants to be. New night splint can be worn longer, but doesn't really like it.      Current pain level: 3/10     Changes in function:  None     Adverse reaction to treatment or activity:  None    OBJECTIVE  Changes in objective findings:  Yes, tender upon palpation of R posterolateral heel.         ASSESSMENT  Rand continues to require intervention to meet STG and LTG's: PT  No change of symptoms has been noted.  Response to therapy has shown lack of progress in  pain level  Progress made towards STG/LTG?  None    PLAN  Continue current treatment plan until patient demonstrates readiness to progress to higher level exercises.    PTA/ATC plan:  N/A    Please refer to the daily flowsheet for treatment today, total treatment time and time spent performing 1:1 timed codes.

## 2017-09-28 NOTE — MR AVS SNAPSHOT
"              After Visit Summary   9/28/2017    Rand Macario    MRN: [id number]           Patient Information     Date Of Birth          1970        Visit Information        Provider Department      9/28/2017 4:20 PM [facility]        Today's Diagnoses     Plantar fasciitis           Follow-ups after your visit        Your next 10 appointments already scheduled     Oct 05, 2017  4:20 PM CDT   KONG Extremity with Juwan Hong PT   [facility] Physical Therapy ([facility])    [address]  [facility] [id number] [phone]              Who to contact     If you have questions or need follow up information about today's clinic visit or your schedule please contact [facility] directly at [phone].  Normal or non-critical lab and imaging results will be communicated to you by Navdyhart, letter or phone within 4 business days after the clinic has received the results. If you do not hear from us within 7 days, please contact the clinic through Navdyhart or phone. If you have a critical or abnormal lab result, we will notify you by phone as soon as possible.  Submit refill requests through [employer] or call your pharmacy and they will forward the refill request to us. Please allow 3 business days for your refill to be completed.          Additional Information About Your Visit        MyChart Information     [employer] lets you send messages to your doctor, view your test results, renew your prescriptions, schedule appointments and more. To sign up, go to [email or web address] . Click on \"Log in\" on the left side of the screen, which will take you to the Welcome page. Then click on \"Sign up Now\" on the right side of the page.     You will be asked to enter the access code listed below, as well as some personal information. Please " follow the directions to create your username and password.     Your access code is: [email or web address]  Expires: 10/30/2017  6:50 PM     Your access code will  in 90 days. If you need help or a new code, please call your [place] clinic or [phone].        Care EveryWhere ID     This is your Care EveryWhere ID. This could be used by other organizations to access your [place] medical records  [id number]         Blood Pressure from Last 3 Encounters:   No data found for BP    Weight from Last 3 Encounters:   No data found for Wt              We Performed the Following     MANUAL THER TECH,1+REGIONS,EA 15 MIN     ULTRASOUND THERAPY        Primary Care Provider    None Specified       No primary provider on file.        Equal Access to Services     [facility] : Epi De La Rosa, domingo dyer, rosario michel, marie cohen . So [facility] [phone].    ATENCIÓN: Si habla español, tiene a asif disposición servicios gratuitos de asistencia lingüística. Llame al [phone].    We comply with applicable federal civil rights laws and [place] laws. We do not discriminate on the basis of race, color, national origin, age, disability sex, sexual orientation or gender identity.            Thank you!     Thank you for choosing [facility] PHYSICAL THERAPY  for your care. Our goal is always to provide you with excellent care. Hearing back from our patients is one way we can continue to improve our services. Please take a few minutes to complete the written survey that you may receive in the mail after your visit with us. Thank you!             Your Updated Medication List - Protect others around you: Learn how to safely use, store and throw away your medicines at www.disposemymeds.org.      Notice  As of 2017  4:59 PM    You have not been prescribed any medications.

## 2017-10-05 ENCOUNTER — THERAPY VISIT (OUTPATIENT)
Dept: PHYSICAL THERAPY | Facility: CLINIC | Age: 47
End: 2017-10-05
Payer: COMMERCIAL

## 2017-10-05 DIAGNOSIS — M72.2 PLANTAR FASCIITIS: ICD-10-CM

## 2017-10-05 PROCEDURE — 97035 APP MDLTY 1+ULTRASOUND EA 15: CPT | Mod: GP | Performed by: PHYSICAL THERAPIST

## 2017-10-05 PROCEDURE — 97140 MANUAL THERAPY 1/> REGIONS: CPT | Mod: GP | Performed by: PHYSICAL THERAPIST

## 2017-10-05 NOTE — LETTER
[facility]  [address] [facility]  [place] [id number] [phone]  2017  Re: Rand Macario   :   1970  MRN:  [id number]   REFERRING PHYSICIAN:   Corinne Penny  PROGRESS  REPORT  Progress reporting period is from 17 to 10/5/17.     SUBJECTIVE  Subjective changes noted by patient:  Overall 70-80% to where she wants to be. There are times where she feels 100% but then shortly after can have more pain. Progression of sx resolution has plateaued at this point. Continues stretching. Plan to visit MD for further recommendations.        Current pain level is 4/10  .     Previous pain level was  8/10  .   Changes in function:  Yes (See Goal flowsheet attached for changes in current functional level)  Adverse reaction to treatment or activity: None  OBJECTIVE  Changes noted in objective findings:  Yes, R ankle DF AROM 12 deg. Tender upon palpation of lateral border of R calcaneus.      ASSESSMENT/PLAN  Updated problem list and treatment plan: Diagnosis 1:  R plantar fasciitis/fasciosis  Pain -  US, manual therapy, self management, education, directional preference exercise and home program  Decreased ROM/flexibility - manual therapy and therapeutic exercise  Decreased joint mobility - manual therapy, therapeutic exercise, therapeutic activity and home program  Inflammation - self management/home program  Decreased function - therapeutic activities and home program  STG/LTGs have been met or progress has been made towards goals:  Yes (See Goal flow sheet completed today.)  Assessment of Progress: The patient's progress has plateaued.  Self Management Plans:  Patient is independent in a home treatment program.  Patient is independent in self management of symptoms.  I have re-evaluated this patient and find that the nature, scope, duration and intensity of the therapy is appropriate for the medical condition of the patient.  Rand continues  to require the following intervention to meet STG and LTG's:  PT intervention is no longer required to meet STG/LTG.  Recommendations:  This patient would benefit from further evaluation.     Thank you for your referral.  INQUIRIES  Therapist: Juwan Hong DPT  [place] [facility] PH[facility] THERAP[facility]  [address] [place] [address]. [address]  [facility] [address]  Phone: [phone]  Fax: [phone]

## 2017-10-05 NOTE — PROGRESS NOTES
Subjective:    HPI                    Objective:    System    Physical Exam    General     ROS    Assessment/Plan:      PROGRESS  REPORT    Progress reporting period is from 8/1/17 to 10/5/17.       SUBJECTIVE  Subjective changes noted by patient:  Overall 70-80% to where she wants to be. There are times where she feels 100% but then shortly after can have more pain. Progression of sx resolution has plateaued at this point. Continues stretching. Plan to visit MD for further recommendations.        Current pain level is 4/10  .     Previous pain level was  8/10  .   Changes in function:  Yes (See Goal flowsheet attached for changes in current functional level)  Adverse reaction to treatment or activity: None    OBJECTIVE  Changes noted in objective findings:  Yes, R ankle DF AROM 12 deg. Tender upon palpation of lateral border of R calcaneus.          ASSESSMENT/PLAN  Updated problem list and treatment plan: Diagnosis 1:  R plantar fasciitis/fasciosis  Pain -  US, manual therapy, self management, education, directional preference exercise and home program  Decreased ROM/flexibility - manual therapy and therapeutic exercise  Decreased joint mobility - manual therapy, therapeutic exercise, therapeutic activity and home program  Inflammation - self management/home program  Decreased function - therapeutic activities and home program  STG/LTGs have been met or progress has been made towards goals:  Yes (See Goal flow sheet completed today.)  Assessment of Progress: The patient's progress has plateaued.  Self Management Plans:  Patient is independent in a home treatment program.  Patient is independent in self management of symptoms.  I have re-evaluated this patient and find that the nature, scope, duration and intensity of the therapy is appropriate for the medical condition of the patient.  Rand continues to require the following intervention to meet STG and LTG's:  PT intervention is no longer required to meet  STG/LTG.    Recommendations:  This patient would benefit from further evaluation.    Please refer to the daily flowsheet for treatment today, total treatment time and time spent performing 1:1 timed codes.

## 2017-10-05 NOTE — MR AVS SNAPSHOT
"              After Visit Summary   10/5/2017    Rand Macario    MRN: [id number]           Patient Information     Date Of Birth          1970        Visit Information        Provider Department      10/5/2017 4:20 PM Juwan Hong, PT [facility] Physical Therapy        Today's Diagnoses     Plantar fasciitis           Follow-ups after your visit        Who to contact     If you have questions or need follow up information about today's clinic visit or your schedule please contact [facility] directly at [phone].  Normal or non-critical lab and imaging results will be communicated to you by Moodswiinghart, letter or phone within 4 business days after the clinic has received the results. If you do not hear from us within 7 days, please contact the clinic through Gateshopt or phone. If you have a critical or abnormal lab result, we will notify you by phone as soon as possible.  Submit refill requests through Gal[employer] or call your pharmacy and they will forward the refill request to us. Please allow 3 business days for your refill to be completed.          Additional Information About Your Visit        MyChart Information     Gal[employer] lets you send messages to your doctor, view your test results, renew your prescriptions, schedule appointments and more. To sign up, go to [email or web address] . Click on \"Log in\" on the left side of the screen, which will take you to the Welcome page. Then click on \"Sign up Now\" on the right side of the page.     You will be asked to enter the access code listed below, as well as some personal information. Please follow the directions to create your username and password.     Your access code is: [email or web address]  Expires: 10/30/2017  6:50 PM     Your access code will  in 90 days. If you need help or a new code, please call your [place] clinic or [phone].        Care EveryWhere ID     This " is your Care EveryWhere ID. This could be used by other organizations to access your [place] medical records  [id number]         Blood Pressure from Last 3 Encounters:   No data found for BP    Weight from Last 3 Encounters:   No data found for Wt              We Performed the Following     KONG PROGRESS NOTES REPORT     MANUAL THER TECH,1+REGIONS,EA 15 MIN     ULTRASOUND THERAPY        Primary Care Provider    None Specified       No primary provider on file.        Equal Access to Services     [facility]: Hadii aad ku hadasho Soomaali, waaxda luqadaha, qaybta kaalmada adeegyada, waxay kajalin hayaan adeeg maribelljessetomy cohen . So [facility] [phone].    ATENCIÓN: Si habla español, tiene a asif disposición servicios gratuitos de asistencia lingüística. Llame al [phone].    We comply with applicable federal civil rights laws and [place] laws. We do not discriminate on the basis of race, color, national origin, age, disability, sex, sexual orientation, or gender identity.            Thank you!     Thank you for choosing [place] [facility] PHYS[facility] THERAP[facility]  for your care. Our goal is always to provide you with excellent care. Hearing back from our patients is one way we can continue to improve our services. Please take a few minutes to complete the written survey that you may receive in the mail after your visit with us. Thank you!             Your Updated Medication List - Protect others around you: Learn how to safely use, store and throw away your medicines at www.disposemymeds.org.      Notice  As of 10/5/2017  5:03 PM    You have not been prescribed any medications.

## 2017-12-11 PROBLEM — M72.2 PLANTAR FASCIITIS: Status: RESOLVED | Noted: 2017-08-01 | Resolved: 2017-12-11

## 2018-04-17 ENCOUNTER — TELEPHONE (OUTPATIENT)
Dept: OTHER | Facility: CLINIC | Age: 48
End: 2018-04-17

## 2018-04-17 NOTE — TELEPHONE ENCOUNTER
4/17/2018    Call Regarding Onboarding [employer]    Attempt 1    Message on voicemail     Comments: NO DEPS          Outreach   AT

## 2018-05-10 NOTE — TELEPHONE ENCOUNTER
5/10/2018    Call Regarding Onboarding [employer] OTHER     Attempt 2    Message on voicemail     Comments:       Outreach   SB

## 2018-05-30 ENCOUNTER — TELEPHONE (OUTPATIENT)
Dept: OTHER | Facility: CLINIC | Age: 48
End: 2018-05-30

## 2018-05-30 NOTE — TELEPHONE ENCOUNTER
Selection of Care System:  [facility]    Selection of primary provider/clinic:  [facility]    In general how would you rate your overall health?  Fair    In general how would you rate your overall mental or emotional health?  Good    Have you been to the emergency department 3 or more times in the past 6 months?  No    Do you have any health concerns that you need assistance finding a provider or scheduling an appointment? no    Medications:  not specified    [place] or [facility] assigned to patient: no     Current Pharmacy[facility]location and number: [facility]     Pharmacy of choice: [facility]

## 2024-07-23 ENCOUNTER — OFFICE VISIT (OUTPATIENT)
Dept: AUDIOLOGY | Facility: CLINIC | Age: 54
End: 2024-07-23
Payer: COMMERCIAL

## 2024-07-23 DIAGNOSIS — H90.3 SENSORINEURAL HEARING LOSS (SNHL) OF BOTH EARS: Primary | ICD-10-CM

## 2024-07-23 PROCEDURE — 92550 TYMPANOMETRY & REFLEX THRESH: CPT | Performed by: AUDIOLOGIST

## 2024-07-23 PROCEDURE — 92557 COMPREHENSIVE HEARING TEST: CPT | Performed by: AUDIOLOGIST

## 2024-07-23 NOTE — PROGRESS NOTES
AUDIOLOGY REPORT    SUBJECTIVE:  Rand Macario is a 53 year old female who was seen in the Audiology Clinic at the [facility] for audiologic evaluation. The patient reports a gradual hearing loss bilaterally. The patient also reports occasional brief tinnitus bilaterally. The patient denies otalgia, aural fullness, otorrhea, and dizziness.  The patient notes difficulty with communication in a variety of listening situations.     OBJECTIVE:  Abuse Screening:  Do you feel unsafe at home or work/school? No  Do you feel threatened by someone? No  Does anyone try to keep you from having contact with others, or doing things outside of your home? No  Physical signs of abuse present? No     Fall Risk Screen:  1. Have you fallen two or more times in the past year? No  2. Have you fallen and had an injury in the past year? No    Otoscopic exam indicates ears are clear of cerumen bilaterally     Pure Tone Thresholds assessed using conventional audiometry with good  reliability from 250-8000 Hz bilaterally using insert earphones     RIGHT:  normal sloping to moderate sensorineural hearing loss    LEFT:    normal sloping to moderate sensorineural hearing loss    Tympanogram:    RIGHT: normal eardrum mobility    LEFT:   normal eardrum mobility    Reflexes (reported by stimulus ear):  RIGHT: Ipsilateral is absent at frequencies tested  RIGHT: Contralateral is absent at frequencies tested  LEFT:   Ipsilateral is absent at frequencies tested  LEFT:   Contralateral is elevated      Speech Reception Threshold:    RIGHT: 30 dB HL    LEFT:   25 dB HL  Word Recognition Score:     RIGHT: 100% at 70 dB HL using NU-6 recorded word list.    LEFT:   100% at 70 dB HL using NU-6 recorded word list.      ASSESSMENT:     ICD-10-CM    1. Sensorineural hearing loss (SNHL) of both ears  H90.3 Cmprhn Audiometry Thrshld Eval & Speech Recog (66635)     Tymps / Reflex   (90793)          Today s results were discussed with the  patient in detail.     PLAN:  Patient was counseled regarding hearing loss and impact on communication. Patient is a good candidate for amplification at this time. It is recommended that the patient be retested in approximately 1 year or sooner if new concerns arise.  Please call this clinic with questions regarding these results or recommendations.        Gita Chacon.  Doctor of Audiology  [place] License # [id number]

## 2024-08-06 ENCOUNTER — TRANSFERRED RECORDS (OUTPATIENT)
Dept: HEALTH INFORMATION MANAGEMENT | Facility: CLINIC | Age: 54
End: 2024-08-06

## 2024-08-06 LAB — RETINOPATHY: NEGATIVE

## 2024-09-18 ENCOUNTER — TRANSFERRED RECORDS (OUTPATIENT)
Dept: HEALTH INFORMATION MANAGEMENT | Facility: CLINIC | Age: 54
End: 2024-09-18

## 2024-09-21 ENCOUNTER — HEALTH MAINTENANCE LETTER (OUTPATIENT)
Age: 54
End: 2024-09-21

## 2024-10-09 ENCOUNTER — TRANSFERRED RECORDS (OUTPATIENT)
Dept: HEALTH INFORMATION MANAGEMENT | Facility: CLINIC | Age: 54
End: 2024-10-09

## 2024-11-07 ENCOUNTER — TRANSFERRED RECORDS (OUTPATIENT)
Dept: HEALTH INFORMATION MANAGEMENT | Facility: CLINIC | Age: 54
End: 2024-11-07
Payer: COMMERCIAL

## 2024-11-11 ENCOUNTER — TRANSCRIBE ORDERS (OUTPATIENT)
Dept: OTHER | Age: 54
End: 2024-11-11

## 2024-11-11 ENCOUNTER — MEDICAL CORRESPONDENCE (OUTPATIENT)
Dept: HEALTH INFORMATION MANAGEMENT | Facility: CLINIC | Age: 54
End: 2024-11-11
Payer: COMMERCIAL

## 2024-11-11 ENCOUNTER — PATIENT OUTREACH (OUTPATIENT)
Dept: ONCOLOGY | Facility: CLINIC | Age: 54
End: 2024-11-11
Payer: COMMERCIAL

## 2024-11-11 DIAGNOSIS — C50.212 MALIGNANT NEOPLASM OF UPPER-INNER QUADRANT OF LEFT FEMALE BREAST (H): Primary | ICD-10-CM

## 2024-11-11 NOTE — PROGRESS NOTES
New Patient Oncology Nurse Navigator Note     Referring provider: Radha Sood      Referring Clinic/Organization: [facility]    Referred to (specialty:) Medical Oncology and Radiation Oncology      Date Referral Received: November 11, 2024     Evaluation for:  C50.212 (ICD-10-CM) - Malignant neoplasm of upper-inner quadrant of left female breast (H)     Clinical History (per Nurse review of records provided):      Rand Macario is a 54 year old female who presented for bilateral screening mammograms on 9/18/24 revealing a focal asymmetry in the upper inner quadrant of the left breast at approximately 10:00 position, 3 cm from the nipple. Diagnostic imaging followed on 10/9 and left breast ultrasound showed an irregular, hypoechoic, spiculated mass at the 11:00 position, 3 cm from the nipple. The mass measured 1.2 x 1.2 x 1.0 cm.   IMAGING NEEDED (screening mammogram and left ultrasoun)    10/9/24 Case: [id number]     A) LEFT BREAST, 11:00, 3 CM FROM NIPPLE, ULTRASOUND-GUIDED CORE BIOPSY:   1. Invasive ductal carcinoma      a. Cabot grade: II of III; Cabot score: 7 of 9      b. Angio-lymphatic invasion: Absent      c. Associated DCIS: Absent   2. Breast Ancillary Testing:        a. Hormone Receptors:             Estrogen receptor: Positive (99%, strong staining)             Progesterone receptor: Positive (95%, strong staining)       b. HER2 by IHC: Negative (1+ by manual morphometry)       c. Ki-67: 17% by image analysis     10/31/24 - Case: [id number]   A.  Breast, left, lumpectomy - Invasive lobular, grade 2, 1.0 cm  B.  Lymph nodes, left axillary sentinel , excision - Two lymph nodes, negative for metastatic carcinoma (0/2).     11/12 3225 - Telephoned and spoke with patient. Explained my role and purpose for the call. Writer received referral, reviewed for appropriate plan, and call warm transferred to New Patient Scheduling for completion.     Patient has a colonoscopy  scheduled for 12/5 but won't begin final prep until the evening of 12/4 per patient.     Patient resides in [place], [place] (closest to ).    Records Location: Care Everywhere, Media, and See Bookmarked material     Records Needed:  Path reports-biopsy and surgery (per protocol)  Pathology reviews (per protocol)  Breast imaging for past 5 years - No imaging on PACS as of 11/17/24  Systemic imaging (per protocol)  OP note, surgeons note (per protocol)  Genetic results (per protocol)

## 2024-11-12 ENCOUNTER — PRE VISIT (OUTPATIENT)
Dept: ONCOLOGY | Facility: CLINIC | Age: 54
End: 2024-11-12
Payer: COMMERCIAL

## 2024-11-12 NOTE — TELEPHONE ENCOUNTER
RECORDS STATUS - BREAST    RECORDS REQUESTED FROM:    Appt Date: TBD NN WQ    Malignant neoplasm of upper-inner quadrant of left female breast (H)     Action    Action Taken 2024 9:50AM KEB     [facility] Ph: [phone]- they will push the scans from [facility] to  PACS    I called [facility] [phone], opt 2- I was transferred.. they pulled up the pt's chart. Images will be pushed and reports faxed.      Imaging Received  2024 1:08 PM    Action: Breast Images from [facility]/NM received and email sent to  Imaging team to resolve breast images to PACS.        NOTES DETAILS STATUS   OFFICE NOTE from referring provider [facility] 10/31/24: Dr. Radha Sood   OPERATIVE REPORT - NM 10/31/24: lumpectomy   10/9/24:  Breast    CLINICAL TRIAL TREATMENTS TO DATE [facility]    LABS     PATHOLOGY REPORTS  (Tissue diagnosis, Stage, ER/ID percentage positive and intensity of staining, HER2 IHC, FISH, and all biopsies from breast and any distant metastasis)                 Report in CE- NM/ - [facility]  (Slides Requested) NM:  10/31/24: [id number]     [facility]:  10/9/24: [id number]      PATHOLOGY FEDEX TRACKING:   NM TRACKING #: [id number]    [facility] TRACKIN   IMAGING (NEED IMAGES & REPORT)     NM Breast Requested- [facility] 10/31/2024    MAMMO Requested- [facility] 10/31/2024, 10/15/2024   ULTRASOUND Requested- [facility] 10/31/2024

## 2024-11-14 ENCOUNTER — TRANSCRIBE ORDERS (OUTPATIENT)
Dept: ONCOLOGY | Facility: CLINIC | Age: 54
End: 2024-11-14
Payer: COMMERCIAL

## 2024-11-14 DIAGNOSIS — C50.919 BREAST CANCER (H): Primary | ICD-10-CM

## 2024-11-18 ENCOUNTER — DOCUMENTATION ONLY (OUTPATIENT)
Dept: ONCOLOGY | Facility: CLINIC | Age: 54
End: 2024-11-18
Payer: COMMERCIAL

## 2024-11-18 NOTE — NURSING NOTE
[facility]:  Cancer Care Note    Order for Oncotype Dx testing was submitted via the [employer] portal today, as requested by  Dr. Ybarra .  A copy of patient's pathology results report, face sheet, and copies of insurance cards were faxed to [employer] at [phone].      [employer] Order Number: [id number].       Susanna Benites RN  RN Care Coordinator - Oncology  [facility]

## 2024-11-20 ENCOUNTER — LAB REQUISITION (OUTPATIENT)
Dept: LAB | Facility: CLINIC | Age: 54
End: 2024-11-20
Payer: COMMERCIAL

## 2024-11-20 PROCEDURE — 88321 CONSLTJ&REPRT SLD PREP ELSWR: CPT | Mod: GC | Performed by: PATHOLOGY

## 2024-11-22 ENCOUNTER — ANCILLARY PROCEDURE (OUTPATIENT)
Dept: GENERAL RADIOLOGY | Facility: CLINIC | Age: 54
End: 2024-11-22
Attending: INTERNAL MEDICINE
Payer: COMMERCIAL

## 2024-11-22 ENCOUNTER — LAB REQUISITION (OUTPATIENT)
Dept: LAB | Facility: CLINIC | Age: 54
End: 2024-11-22
Payer: COMMERCIAL

## 2024-11-22 DIAGNOSIS — Z17.0 MALIGNANT NEOPLASM OF LEFT BREAST IN FEMALE, ESTROGEN RECEPTOR POSITIVE, UNSPECIFIED SITE OF BREAST (H): ICD-10-CM

## 2024-11-22 DIAGNOSIS — C50.912 MALIGNANT NEOPLASM OF LEFT BREAST IN FEMALE, ESTROGEN RECEPTOR POSITIVE, UNSPECIFIED SITE OF BREAST (H): ICD-10-CM

## 2024-11-22 PROCEDURE — 71046 X-RAY EXAM CHEST 2 VIEWS: CPT | Mod: GC | Performed by: RADIOLOGY

## 2024-11-22 PROCEDURE — 88321 CONSLTJ&REPRT SLD PREP ELSWR: CPT | Performed by: PATHOLOGY

## 2024-11-25 ENCOUNTER — TRANSFERRED RECORDS (OUTPATIENT)
Dept: HEALTH INFORMATION MANAGEMENT | Facility: CLINIC | Age: 54
End: 2024-11-25
Payer: COMMERCIAL

## 2024-11-25 NOTE — PROGRESS NOTES
Dear Colleagues,  Today Rand Macario was seen in consultation.  IDENTIFICATION: This is a 54 year old woman with left (UIQ) breast cancer, status post lumpectomy and SLNBx, revealing G2 ILCA, [id number] ER+/ME+/H2N- disease referred for adjuvant radiation therapy. She will not be receiving chemotherapy.  HISTORY OF PRESENT ILLNESS: Rand Macario was in her usual state of health earlier this year. On 9/18/24 she had bilateral screening mmg that showed left breast UIQ focal asymmetry.  The other breast was unremarkable.  There was no diagnostic mmg completed.  US guided biopsy of the left breast on 10/9/24 showed G2 IDCA w/ no LVSI/DCIS, ER+/ME+/Her2-.  On 10/31/24 Dr. Sood took her to the OR and she had a left breast lumpectomy and SLNBx.  Pathology revealed single focus 1.0cm of grade 2 ILCa. No DCIS or LVSI. +LCIS. All invasive margins were negative; invasive tumor is 0.4 cm from nearest (anterior) margin.  +0/2 involved lymph nodes.  ER+/ME+/HER2-.  Specimen radiograph was completed. The imaged specimen includes the lesion, radioactive pellet, and biopsy clip.  Her postoperative course has been unenventful.  She was recently seen by Dr. Ybarra.  Her Oncotype score returned back as 10. The benefit of treatment did not outweigh the risks and no systemic chemotherapy is recommended.  She is to receive adjuvant endocrine therapy after XRT.    Since her surgery, she has continued to heal well and denies any significant pain.  She has good ROM.  She denies any other new masses, skin changes, shortness of breath, chest or bony pain, or new neurologic symptoms. She is being seen here today for consideration of postoperative radiotherapy.  REVIEW OF SYSTEMS: As per HPI, a 14-point review of system is otherwise negative.  PAST RADIATION THERAPY:  Denies.  PAST CTD/PACEMAKER: Denies  BREAST RISK FACTORS:  No history of prior breast biopsy. No family history of breast or ovarian cancer. She started her menses at age 7.   G0.  Hysterectomy age 44.  No history of HRT. OCP use 15 years.    Past Medical History:   Diagnosis Date    Diabetes mellitus, type 2 (H) 12/04/2024    GERD (gastroesophageal reflux disease) 06/11/2008    Headache 06/11/2008    High blood pressure associated with diabetes (H) 12/04/2024    Meningitis 06/11/2008    Obesity due to excess calories 10/31/2024       Past Surgical History:   Procedure Laterality Date    BREAST BIOPSY, RT/LT Left 10/09/2024    HYSTERECTOMY  2014    LUMPECTOMY BREAST WITH SENTINEL NODE, COMBINED Left 10/31/2024    L breast lumpectomy with L SLN bx Dr. Nahum Kaplan       History reviewed. No pertinent family history.    Social History     Tobacco Use    Smoking status: Never    Smokeless tobacco: Never   Substance Use Topics    Alcohol use: Yes     Comment: Occ.     Current Outpatient Medications   Medication Sig Dispense Refill    acetaminophen (TYLENOL) 500 MG tablet Take 500-1,000 mg by mouth every 6 hours as needed.      atorvastatin (LIPITOR) 40 MG tablet       empagliflozin (JARDIANCE) 25 MG TABS tablet       fluticasone (FLONASE) 50 MCG/ACT nasal spray Spray 1 spray into both nostrils as needed for allergies.      losartan (COZAAR) 25 MG tablet Take 50 mg by mouth.      metFORMIN (GLUCOPHAGE) 500 MG tablet Take 500 mg by mouth.      naltrexone-bupropion (CONTRAVE) 8-90 MG per 12 hr tablet       omeprazole (PRILOSEC) 20 MG DR capsule Take 20 mg by mouth.      RYBELSUS 14 MG tablet TAKE 1 TABLET BY MOUTH DAILY 30 MINUTES BEFORE FIRST FOOD OR BEVERAGE OR MEDICINE OF THE DAY       No current facility-administered medications for this visit.      No Known Allergies    PHYSICAL EXAMINATION:  /84   Pulse 72   Temp 98.4  F (36.9  C) (Oral)   Resp 16   Wt 97.5 kg (215 lb)   SpO2 98%   BMI 35.78 kg/m    GENERAL Well-appearing woman in no acute distress.  HEENT Normocephalic, atraumatic.  Sclerae anicteric.  CVR  Regular rate and rhythm.  No murmurs, rubs, or gallops.  LUNGS Clear  to auscultation bilaterally.  BREASTS Breasts are examined in the supine and upright position.  The breasts are symmetric.  The right breast is unremarkable, as there is no erythema, ulceration or suspicious nodularity within it.  Within the left periareolar breast and left axilla there are two well healed incisions.  Firmness of these incisions is not suspicious.  There is no suspicious erythema, ulceration or nodularity within the left breast.  There is no erythema, desquamation or discharge appreciated within the right or left nipple areolar complex.  The left NAC is retracted which pt states is chronic/normal for her.  The right is not retracted.  LYMPH No supraclavicular, infraclavicular, or axillary lymphadenopathy appreciated bilaterally.  ABDOMEN Soft.    EXT  No clubbing or cyanosis or edema.    NEURO No gross deficits.  MSK  Good ROM.   SKIN  Warm and well perfused.    PSYCH  Alert and oriented x 3    ECOG PERFORMANCE STATUS: 0    IMAGING: All relevant imaging was reviewed as stated in the HPI.  IMPRESSION/PLAN: Rand Macario is a 54 year old woman with left (UIQ) breast cancer, status post lumpectomy and SLNBx, revealing G2 ILCA, [id number] ER+/MS+/H2N- disease referred for adjuvant radiation therapy. She will not be receiving chemotherapy.  I recommend adjuvant XRT to improve local control and overall survival.  Plan is to treat the affected breast based on multiple randomized prospective data which show decrease risk of local recurrence by ~50% with the addition of XRT to BCS and the EBCTCG metaanalysis published in Lancet 2011 which shows that for every 4 recurrences avoided by year 10 one breast cancer death is avoided by year 15.    The risks, benefits, treatment rationale and regimen of radiation therapy to the breast were discussed in great detail today with the patient.  Risks include but are not limited to skin erythema, desquamation, hyperpigmentation, breast and lymphedema, fibrosis,  telengectasia, pneumonitis, cardiac toxicity, rib fracture and secondary malignancy. Different fractionation regimens were discussed; conventional, hypofractionation, and FASTFORWARD.  The patient consented to therapy and is scheduled for a CT simulation. Treatment will start within 1-2 weeks.    Additional problem list to be addressed in the following manner:  Systemic/hormonal treatment : No systemic chemo recommended.  Will f/u with Med Onc 1-2 weeks after XRT completed to discuss adjuvant endocrine therapy.   There was ample time for questions and all were answered to the patient's satisfaction. Thank you for allowing me to participate in the care of this pleasant patient. If you have any questions, please do not hesitate to contact my office.    Sincerely,  Shaggy Ann MD      I spent a total of 75 minutes for this encounter, which included some of the following:  -Preparing to see the patient, including reviewing testing results  -Obtaining and/or reviewing separately obtained history  -Performing the examination  -Counseling and educating the patient  -Ordering medications, tests, or procedures  -Referring and communicating with other health care professions, which is not separately reported  -Documenting clinical information in the electronic health record  -Independently interpreting results and communicating the results to the patient/family/caregiver  -Coordinating care, which is not separately reportable

## 2024-12-04 ENCOUNTER — OFFICE VISIT (OUTPATIENT)
Dept: RADIATION ONCOLOGY | Facility: CLINIC | Age: 54
End: 2024-12-04
Payer: COMMERCIAL

## 2024-12-04 VITALS
RESPIRATION RATE: 16 BRPM | OXYGEN SATURATION: 98 % | SYSTOLIC BLOOD PRESSURE: 134 MMHG | WEIGHT: 215 LBS | HEIGHT: 65 IN | HEART RATE: 72 BPM | DIASTOLIC BLOOD PRESSURE: 84 MMHG | TEMPERATURE: 98.4 F | BODY MASS INDEX: 35.82 KG/M2

## 2024-12-04 DIAGNOSIS — C50.919 BREAST CANCER (H): ICD-10-CM

## 2024-12-04 PROBLEM — E11.9 DIABETES MELLITUS, TYPE 2 (H): Status: ACTIVE | Noted: 2024-12-04

## 2024-12-04 PROBLEM — E66.09 OBESITY DUE TO EXCESS CALORIES: Status: ACTIVE | Noted: 2024-10-31

## 2024-12-04 PROBLEM — E11.59 HIGH BLOOD PRESSURE ASSOCIATED WITH DIABETES (H): Status: ACTIVE | Noted: 2024-12-04

## 2024-12-04 PROBLEM — I15.2 HIGH BLOOD PRESSURE ASSOCIATED WITH DIABETES (H): Status: ACTIVE | Noted: 2024-12-04

## 2024-12-04 RX ORDER — FLUTICASONE PROPIONATE 50 MCG
1 SPRAY, SUSPENSION (ML) NASAL PRN
COMMUNITY

## 2024-12-04 RX ORDER — ACETAMINOPHEN 500 MG
500-1000 TABLET ORAL EVERY 6 HOURS PRN
COMMUNITY

## 2024-12-04 ASSESSMENT — PAIN SCALES - GENERAL: PAINLEVEL_OUTOF10: NO PAIN (0)

## 2024-12-04 NOTE — LETTER
12/4/2024      Rand Macario  [address] [place] [id number]      Dear Colleague,    Thank you for referring your patient, Rand Macario, to the [facility] RADIATION ONCOLOGY [place]. Please see a copy of my visit note below.    Dear Colleagues,  Today Rand Macario was seen in consultation.  IDENTIFICATION: This is a 54 year old woman with left (UIQ) breast cancer, status post lumpectomy and SLNBx, revealing G2 ILCA, [id number] ER+/MS+/H2N- disease referred for adjuvant radiation therapy. She will not be receiving chemotherapy.  HISTORY OF PRESENT ILLNESS: Rand Macario was in her usual state of health earlier this year. On 9/18/24 she had bilateral screening mmg that showed left breast UIQ focal asymmetry.  The other breast was unremarkable.  There was no diagnostic mmg completed.  US guided biopsy of the left breast on 10/9/24 showed G2 IDCA w/ no LVSI/DCIS, ER+/MS+/Her2-.  On 10/31/24 Dr. Sood took her to the OR and she had a left breast lumpectomy and SLNBx.  Pathology revealed single focus 1.0cm of grade 2 ILCa. No DCIS or LVSI. +LCIS. All invasive margins were negative; invasive tumor is 0.4 cm from nearest (anterior) margin.  +0/2 involved lymph nodes.  ER+/MS+/HER2-.  Specimen radiograph was completed. The imaged specimen includes the lesion, radioactive pellet, and biopsy clip.  Her postoperative course has been unenventful.  She was recently seen by Dr. Ybarra.  Her Oncotype score returned back as 10. The benefit of treatment did not outweigh the risks and no systemic chemotherapy is recommended.  She is to receive adjuvant endocrine therapy after XRT.    Since her surgery, she has continued to heal well and denies any significant pain.  She has good ROM.  She denies any other new masses, skin changes, shortness of breath, chest or bony pain, or new neurologic symptoms. She is being seen here today for consideration of postoperative radiotherapy.  REVIEW OF SYSTEMS: As per HPI, a 14-point  review of system is otherwise negative.  PAST RADIATION THERAPY:  Denies.  PAST CTD/PACEMAKER: Denies  BREAST RISK FACTORS:  No history of prior breast biopsy. No family history of breast or ovarian cancer. She started her menses at age 7.  G0.  Hysterectomy age 44.  No history of HRT. OCP use 15 years.    Past Medical History:   Diagnosis Date     Diabetes mellitus, type 2 (H) 12/04/2024     GERD (gastroesophageal reflux disease) 06/11/2008     Headache 06/11/2008     High blood pressure associated with diabetes (H) 12/04/2024     Meningitis 06/11/2008     Obesity due to excess calories 10/31/2024       Past Surgical History:   Procedure Laterality Date     BREAST BIOPSY, RT/LT Left 10/09/2024     HYSTERECTOMY  2014     LUMPECTOMY BREAST WITH SENTINEL NODE, COMBINED Left 10/31/2024    L breast lumpectomy with L SLN bx Dr. Nahum Kaplan       History reviewed. No pertinent family history.    Social History     Tobacco Use     Smoking status: Never     Smokeless tobacco: Never   Substance Use Topics     Alcohol use: Yes     Comment: Occ.     Current Outpatient Medications   Medication Sig Dispense Refill     acetaminophen (TYLENOL) 500 MG tablet Take 500-1,000 mg by mouth every 6 hours as needed.       atorvastatin (LIPITOR) 40 MG tablet        empagliflozin (JARDIANCE) 25 MG TABS tablet        fluticasone (FLONASE) 50 MCG/ACT nasal spray Spray 1 spray into both nostrils as needed for allergies.       losartan (COZAAR) 25 MG tablet Take 50 mg by mouth.       metFORMIN (GLUCOPHAGE) 500 MG tablet Take 500 mg by mouth.       naltrexone-bupropion (CONTRAVE) 8-90 MG per 12 hr tablet        omeprazole (PRILOSEC) 20 MG DR capsule Take 20 mg by mouth.       RYBELSUS 14 MG tablet TAKE 1 TABLET BY MOUTH DAILY 30 MINUTES BEFORE FIRST FOOD OR BEVERAGE OR MEDICINE OF THE DAY       No current facility-administered medications for this visit.      No Known Allergies    PHYSICAL EXAMINATION:  /84   Pulse 72   Temp 98.4  F  (36.9  C) (Oral)   Resp 16   Wt 97.5 kg (215 lb)   SpO2 98%   BMI 35.78 kg/m    GENERAL Well-appearing woman in no acute distress.  HEENT Normocephalic, atraumatic.  Sclerae anicteric.  CVR  Regular rate and rhythm.  No murmurs, rubs, or gallops.  LUNGS Clear to auscultation bilaterally.  BREASTS Breasts are examined in the supine and upright position.  The breasts are symmetric.  The right breast is unremarkable, as there is no erythema, ulceration or suspicious nodularity within it.  Within the left periareolar breast and left axilla there are two well healed incisions.  Firmness of these incisions is not suspicious.  There is no suspicious erythema, ulceration or nodularity within the left breast.  There is no erythema, desquamation or discharge appreciated within the right or left nipple areolar complex.  The left NAC is retracted which pt states is chronic/normal for her.  The right is not retracted.  LYMPH No supraclavicular, infraclavicular, or axillary lymphadenopathy appreciated bilaterally.  ABDOMEN Soft.    EXT  No clubbing or cyanosis or edema.    NEURO No gross deficits.  MSK  Good ROM.   SKIN  Warm and well perfused.    PSYCH  Alert and oriented x 3    ECOG PERFORMANCE STATUS: 0    IMAGING: All relevant imaging was reviewed as stated in the HPI.  IMPRESSION/PLAN: Rand Macario is a 54 year old woman with left (UIQ) breast cancer, status post lumpectomy and SLNBx, revealing G2 ILCA, [id number] ER+/MA+/H2N- disease referred for adjuvant radiation therapy. She will not be receiving chemotherapy.  I recommend adjuvant XRT to improve local control and overall survival.  Plan is to treat the affected breast based on multiple randomized prospective data which show decrease risk of local recurrence by ~50% with the addition of XRT to BCS and the EBCTCG metaanalysis published in Lancet 2011 which shows that for every 4 recurrences avoided by year 10 one breast cancer death is avoided by year 15.    The risks,  benefits, treatment rationale and regimen of radiation therapy to the breast were discussed in great detail today with the patient.  Risks include but are not limited to skin erythema, desquamation, hyperpigmentation, breast and lymphedema, fibrosis, telengectasia, pneumonitis, cardiac toxicity, rib fracture and secondary malignancy. Different fractionation regimens were discussed; conventional, hypofractionation, and FASTFORWARD.  The patient consented to therapy and is scheduled for a CT simulation. Treatment will start within 1-2 weeks.    Additional problem list to be addressed in the following manner:  Systemic/hormonal treatment : No systemic chemo recommended.  Will f/u with Med Onc 1-2 weeks after XRT completed to discuss adjuvant endocrine therapy.   There was ample time for questions and all were answered to the patient's satisfaction. Thank you for allowing me to participate in the care of this pleasant patient. If you have any questions, please do not hesitate to contact my office.    Sincerely,  Shaggy Ann MD      I spent a total of 75 minutes for this encounter, which included some of the following:  -Preparing to see the patient, including reviewing testing results  -Obtaining and/or reviewing separately obtained history  -Performing the examination  -Counseling and educating the patient  -Ordering medications, tests, or procedures  -Referring and communicating with other health care professions, which is not separately reported  -Documenting clinical information in the electronic health record  -Independently interpreting results and communicating the results to the patient/family/caregiver  -Coordinating care, which is not separately reportable          Again, thank you for allowing me to participate in the care of your patient.        Sincerely,        KRYSTYNA Ann MD

## 2024-12-04 NOTE — NURSING NOTE
"Oncology Rooming Note    December 4, 2024 2:17 PM   Rand Macario is a 54 year old female who presents for:    Chief Complaint   Patient presents with    Radiation Therapy     New Radiation Oncology Consult with Dr. Ann      Initial Vitals: /84   Pulse 72   Temp 98.4  F (36.9  C) (Oral)   Resp 16   Wt 97.5 kg (215 lb)   SpO2 98%   BMI 35.78 kg/m   Estimated body mass index is 35.78 kg/m  as calculated from the following:    Height as of 11/22/24: 1.651 m (5' 5\").    Weight as of this encounter: 97.5 kg (215 lb). Body surface area is 2.11 meters squared.  No Pain (0) Comment: Data Unavailable   No LMP recorded. Patient has had a hysterectomy.  Allergies reviewed: Yes  Medications reviewed: Yes    Medications: Medication refills not needed today.  Pharmacy name entered into [facility]: [id number] [place] [id number] AT [facility] [address]    Frailty Screening:   Is the patient here for a new oncology consult visit in cancer care? 1. Yes. Over the past month, have you experienced difficulty or required a caregiver to assist with:   1. Balance, walking or general mobility (including any falls)? NO  2. Completion of self-care tasks such as bathing, dressing, toileting, grooming/hygiene?  NO  3. Concentration or memory that affects your daily life?  NO       Clinical concerns: New Radiation Oncology Consult  Dr. Ann was notified.    Considerations for radiation treatment   Pregnancy status: Female with hysterectomy   Implanted Cardiac Devices: No   Any previous radiation therapy: No    Radiation Therapy Patient Education    Person involved with teaching: Patient    Patient educational needs for self management of treatment-related side effects assessment completed.  [facility] Patient Ed tab contains Patient Learning Assessment    Education Materials Given  Radiation Therapy and You, Skin Care During Radiation Treatment, and Coping with Fatigue    Educational Topics Discussed  Side " effects expected and Skin care, Aquaphor or Vanicream, Hydration, Protein intake    Response To Teaching  Verbalizes understanding    GYN Only  Vaginal Dilator-given and educated: N/A    Referrals sent: None    Chemotherapy?  No     Ellen Rider RN

## 2024-12-12 ENCOUNTER — APPOINTMENT (OUTPATIENT)
Dept: RADIATION ONCOLOGY | Facility: CLINIC | Age: 54
End: 2024-12-12
Payer: COMMERCIAL

## 2024-12-16 ENCOUNTER — APPOINTMENT (OUTPATIENT)
Dept: RADIATION ONCOLOGY | Facility: CLINIC | Age: 54
End: 2024-12-16
Payer: COMMERCIAL

## 2024-12-17 ENCOUNTER — OFFICE VISIT (OUTPATIENT)
Dept: RADIATION ONCOLOGY | Facility: CLINIC | Age: 54
End: 2024-12-17
Payer: COMMERCIAL

## 2024-12-17 VITALS
BODY MASS INDEX: 36.61 KG/M2 | SYSTOLIC BLOOD PRESSURE: 126 MMHG | WEIGHT: 220 LBS | HEART RATE: 73 BPM | RESPIRATION RATE: 16 BRPM | OXYGEN SATURATION: 100 % | DIASTOLIC BLOOD PRESSURE: 83 MMHG

## 2024-12-17 DIAGNOSIS — Z17.0 MALIGNANT NEOPLASM OF UPPER-INNER QUADRANT OF LEFT BREAST IN FEMALE, ESTROGEN RECEPTOR POSITIVE (H): Primary | ICD-10-CM

## 2024-12-17 DIAGNOSIS — C50.212 MALIGNANT NEOPLASM OF UPPER-INNER QUADRANT OF LEFT BREAST IN FEMALE, ESTROGEN RECEPTOR POSITIVE (H): Primary | ICD-10-CM

## 2024-12-17 PROCEDURE — 99207 PR DROP WITH A PROCEDURE: CPT | Performed by: RADIOLOGY

## 2024-12-17 ASSESSMENT — PAIN SCALES - GENERAL: PAINLEVEL_OUTOF10: NO PAIN (0)

## 2024-12-17 NOTE — LETTER
2024      Rand Macario  [address] [facility] [address]      Dear Colleague,    Thank you for referring your patient, Rand Macario, to the [facility] RADIATION ONCOLOGY [place]. Please see a copy of my visit note below.    [facility] PHYSICIANS  SPECIALIZING IN BREAKTHROUGHS  Radiation Oncology    On Treatment Visit Note      Rand Macario      Date: 2024   MRN: [id number]   : 1970  Diagnosis: L IDC w/lobular ER/CO+ HER2-      Reason for Visit:  On Radiation Treatment Visit     Treatment Summary to Date  Treatment Site: L breast + bst Current Dose: 266/5256 cGy Fractions:       Chemotherapy  Chemo concurrent with radx?: No (Dr. Ybarra)    Subjective:     Early in treatment doing well    Nursing ROS:   Nutrition Alteration  Diet Type: Patient's Preference  Skin  Skin Reaction: 0 - No changes  Skin Intervention: Aquaphor 2 x day        Cardiovascular  Respiratory effort: 1 - Normal - without distress        Psychosocial  Psychosocial Note: Patient denies fatigue.  Pain Assessment  0-10 Pain Scale: 0      Objective:   /83   Pulse 73   Resp 16   Wt 99.8 kg (220 lb)   SpO2 100%   BMI 36.61 kg/m    Gen: Appears well, in no acute distress  Skin: No erythema    Labs:  CBC RESULTS:   Recent Labs   Lab Test 24  1535   WBC 7.3   RBC 4.77   HGB 12.7   HCT 40.5   MCV 85   MCH 26.6   MCHC 31.4*   RDW 14.6        ELECTROLYTES:  Recent Labs   Lab Test 24  1535      POTASSIUM 4.2   CHLORIDE 102   UMANG 9.9   CO2 25   BUN 12.2   CR 0.66   GLC 93       Assessment:    Tolerating radiation therapy well.  All questions and concerns addressed.        Plan:   Continue current therapy.    Skin care per above      Mosaiq chart and setup information reviewed  Ports checked    Medication Review  Med list reviewed with patient?: Yes    Educational Topic Discussed  Education Instructions: Reviewed side effects of radiation therapy, skin cares, fatigue.        Shaggy  MD Marissa    Please do not send letter to referring physician.        Again, thank you for allowing me to participate in the care of your patient.        Sincerely,        KRYSTYNA Ann MD

## 2024-12-17 NOTE — PROGRESS NOTES
[facility] PHYSICIANS  SPECIALIZING IN BREAKTHROUGHS  Radiation Oncology    On Treatment Visit Note      Rand Macario      Date: 2024   MRN: [id number]   : 1970  Diagnosis: L IDC w/lobular ER/DC+ HER2-      Reason for Visit:  On Radiation Treatment Visit     Treatment Summary to Date  Treatment Site: L breast + bst Current Dose: 266/5256 cGy Fractions:       Chemotherapy  Chemo concurrent with radx?: No (Dr. Ybarra)    Subjective:     Early in treatment doing well    Nursing ROS:   Nutrition Alteration  Diet Type: Patient's Preference  Skin  Skin Reaction: 0 - No changes  Skin Intervention: Aquaphor 2 x day        Cardiovascular  Respiratory effort: 1 - Normal - without distress        Psychosocial  Psychosocial Note: Patient denies fatigue.  Pain Assessment  0-10 Pain Scale: 0      Objective:   /83   Pulse 73   Resp 16   Wt 99.8 kg (220 lb)   SpO2 100%   BMI 36.61 kg/m    Gen: Appears well, in no acute distress  Skin: No erythema    Labs:  CBC RESULTS:   Recent Labs   Lab Test 24  1535   WBC 7.3   RBC 4.77   HGB 12.7   HCT 40.5   MCV 85   MCH 26.6   MCHC 31.4*   RDW 14.6        ELECTROLYTES:  Recent Labs   Lab Test 24  1535      POTASSIUM 4.2   CHLORIDE 102   UMANG 9.9   CO2 25   BUN 12.2   CR 0.66   GLC 93       Assessment:    Tolerating radiation therapy well.  All questions and concerns addressed.        Plan:   Continue current therapy.    Skin care per above      Mosaiq chart and setup information reviewed  Ports checked    Medication Review  Med list reviewed with patient?: Yes    Educational Topic Discussed  Education Instructions: Reviewed side effects of radiation therapy, skin cares, fatigue.        Shaggy Ann MD    Please do not send letter to referring physician.

## 2024-12-24 ENCOUNTER — OFFICE VISIT (OUTPATIENT)
Dept: RADIATION ONCOLOGY | Facility: CLINIC | Age: 54
End: 2024-12-24
Payer: COMMERCIAL

## 2024-12-24 VITALS
RESPIRATION RATE: 18 BRPM | DIASTOLIC BLOOD PRESSURE: 81 MMHG | SYSTOLIC BLOOD PRESSURE: 120 MMHG | OXYGEN SATURATION: 100 % | TEMPERATURE: 98.2 F | BODY MASS INDEX: 36.36 KG/M2 | WEIGHT: 218.5 LBS | HEART RATE: 74 BPM

## 2024-12-24 DIAGNOSIS — C50.212 MALIGNANT NEOPLASM OF UPPER-INNER QUADRANT OF LEFT BREAST IN FEMALE, ESTROGEN RECEPTOR POSITIVE (H): Primary | ICD-10-CM

## 2024-12-24 DIAGNOSIS — Z17.0 MALIGNANT NEOPLASM OF UPPER-INNER QUADRANT OF LEFT BREAST IN FEMALE, ESTROGEN RECEPTOR POSITIVE (H): Primary | ICD-10-CM

## 2024-12-24 ASSESSMENT — PAIN SCALES - GENERAL: PAINLEVEL_OUTOF10: NO PAIN (0)

## 2024-12-24 NOTE — PATIENT INSTRUCTIONS
Please contact [facility] RN with questions or concerns following today's appointment.    If you are a patient of Dr. Pruitt call: [phone]   If you are a patient of Dr. Ann call: [phone]    Please feel free to leave a detailed message if your call is not answered.    If your call is not received before 3:00 PM, it may not be returned until the following business day.    If you are receiving radiation treatment and need assistance after 3:00 PM or on the weekends, please call [phone] and ask to speak to the radiation oncologist on-call.    Thank you!    [facility] Radiation Oncology Nursing

## 2024-12-24 NOTE — PROGRESS NOTES
[facility] PHYSICIANS  SPECIALIZING IN BREAKTHROUGHS  Radiation Oncology    On Treatment Visit Note      Rand Macario      Date: 2024   MRN: [id number]   : 1970  Diagnosis: L IDC w/lobular ER/CT+ HER2-      Reason for Visit:  On Radiation Treatment Visit     Treatment Summary to Date  Treatment Site: L breast + bst Current Dose: 1596/5256 cGy Fractions:       Chemotherapy  Chemo concurrent with radx?: No (Dr. Ybarra)    Subjective:     Early in treatment doing well with no complaints.  She is having expected side effects    Nursing ROS:   Nutrition Alteration  Diet Type: Patient's Preference  Skin  Skin Reaction: 1 - Faint erythema or dry desquamation (very faint - no desquamation)  Skin Intervention: patient reports applying Aquaphor one time daily, reviewed increasing to two times daily        Cardiovascular  Respiratory effort: 1 - Normal - without distress        Psychosocial  Psychosocial Note: Patient denies fatigue.  Pain Assessment  0-10 Pain Scale: 0      Objective:   /81 (BP Location: Left arm, Patient Position: Chair, Cuff Size: Adult Large)   Pulse 74   Temp 98.2  F (36.8  C) (Oral)   Resp 18   Wt 99.1 kg (218 lb 8 oz)   SpO2 100%   BMI 36.36 kg/m    Gen: Appears well, in no acute distress  Skin: minimal diffuse erythema over treatment field, skin is intact    Labs:  CBC RESULTS:   Recent Labs   Lab Test 24  1535   WBC 7.3   RBC 4.77   HGB 12.7   HCT 40.5   MCV 85   MCH 26.6   MCHC 31.4*   RDW 14.6        ELECTROLYTES:  Recent Labs   Lab Test 24  1535      POTASSIUM 4.2   CHLORIDE 102   UMANG 9.9   CO2 25   BUN 12.2   CR 0.66   GLC 93       Assessment:    Tolerating radiation therapy well.  All questions and concerns addressed.    Toxicities:  Dermatitis: Grade 1: Faint erythema or dry desquamation    Plan:   Continue current therapy.    Skin care per above, preference is for 3-4 times per day      Mosaiq chart and setup information  reviewed  Ports checked    Medication Review  Med list reviewed with patient?: Yes    Educational Topic Discussed  Education Instructions: Reviewed side effects of radiation therapy, skin cares, fatigue.        Shaggy Ann MD    Please do not send letter to referring physician.

## 2024-12-24 NOTE — LETTER
2024      Rand Macario  [address] [facility] [address]      Dear Colleague,    Thank you for referring your patient, Rand Macario, to the [facility] RADIATION ONCOLOGY [place]. Please see a copy of my visit note below.    [facility] PHYSICIANS  SPECIALIZING IN BREAKTHROUGHS  Radiation Oncology    On Treatment Visit Note      Rand Macario      Date: 2024   MRN: [id number]   : 1970  Diagnosis: L IDC w/lobular ER/MS+ HER2-      Reason for Visit:  On Radiation Treatment Visit     Treatment Summary to Date  Treatment Site: L breast + bst Current Dose: 1596/5256 cGy Fractions:       Chemotherapy  Chemo concurrent with radx?: No (Dr. Ybarra)    Subjective:     Early in treatment doing well with no complaints.  She is having expected side effects    Nursing ROS:   Nutrition Alteration  Diet Type: Patient's Preference  Skin  Skin Reaction: 1 - Faint erythema or dry desquamation (very faint - no desquamation)  Skin Intervention: patient reports applying Aquaphor one time daily, reviewed increasing to two times daily        Cardiovascular  Respiratory effort: 1 - Normal - without distress        Psychosocial  Psychosocial Note: Patient denies fatigue.  Pain Assessment  0-10 Pain Scale: 0      Objective:   /81 (BP Location: Left arm, Patient Position: Chair, Cuff Size: Adult Large)   Pulse 74   Temp 98.2  F (36.8  C) (Oral)   Resp 18   Wt 99.1 kg (218 lb 8 oz)   SpO2 100%   BMI 36.36 kg/m    Gen: Appears well, in no acute distress  Skin: minimal diffuse erythema over treatment field, skin is intact    Labs:  CBC RESULTS:   Recent Labs   Lab Test 24  1535   WBC 7.3   RBC 4.77   HGB 12.7   HCT 40.5   MCV 85   MCH 26.6   MCHC 31.4*   RDW 14.6        ELECTROLYTES:  Recent Labs   Lab Test 24  1535      POTASSIUM 4.2   CHLORIDE 102   UMANG 9.9   CO2 25   BUN 12.2   CR 0.66   GLC 93       Assessment:    Tolerating radiation therapy well.  All questions  and concerns addressed.    Toxicities:  Dermatitis: Grade 1: Faint erythema or dry desquamation    Plan:   Continue current therapy.    Skin care per above, preference is for 3-4 times per day      Mosaiq chart and setup information reviewed  Ports checked    Medication Review  Med list reviewed with patient?: Yes    Educational Topic Discussed  Education Instructions: Reviewed side effects of radiation therapy, skin cares, fatigue.        Shaggy Ann MD    Please do not send letter to referring physician.        Again, thank you for allowing me to participate in the care of your patient.        Sincerely,        KRYSTYNA Ann MD    Electronically signed

## 2024-12-30 ENCOUNTER — APPOINTMENT (OUTPATIENT)
Dept: RADIATION ONCOLOGY | Facility: CLINIC | Age: 54
End: 2024-12-30
Payer: COMMERCIAL

## 2024-12-30 PROCEDURE — 77412 RADIATION TX DELIVERY LVL 3: CPT | Performed by: RADIOLOGY

## 2024-12-30 PROCEDURE — 77387 GUIDANCE FOR RADJ TX DLVR: CPT | Performed by: RADIOLOGY

## 2024-12-31 ENCOUNTER — APPOINTMENT (OUTPATIENT)
Dept: RADIATION ONCOLOGY | Facility: CLINIC | Age: 54
End: 2024-12-31
Payer: COMMERCIAL

## 2024-12-31 PROCEDURE — 77387 GUIDANCE FOR RADJ TX DLVR: CPT | Performed by: RADIOLOGY

## 2024-12-31 PROCEDURE — 77427 RADIATION TX MANAGEMENT X5: CPT | Performed by: RADIOLOGY

## 2024-12-31 PROCEDURE — 77412 RADIATION TX DELIVERY LVL 3: CPT | Performed by: RADIOLOGY

## 2024-12-31 PROCEDURE — 77336 RADIATION PHYSICS CONSULT: CPT | Performed by: SURGERY

## 2025-01-02 ENCOUNTER — APPOINTMENT (OUTPATIENT)
Dept: RADIATION ONCOLOGY | Facility: CLINIC | Age: 55
End: 2025-01-02
Payer: COMMERCIAL

## 2025-01-02 PROCEDURE — 77387 GUIDANCE FOR RADJ TX DLVR: CPT | Performed by: RADIOLOGY

## 2025-01-02 PROCEDURE — 77412 RADIATION TX DELIVERY LVL 3: CPT | Performed by: RADIOLOGY

## 2025-01-03 ENCOUNTER — APPOINTMENT (OUTPATIENT)
Dept: RADIATION ONCOLOGY | Facility: CLINIC | Age: 55
End: 2025-01-03
Payer: COMMERCIAL

## 2025-01-03 PROCEDURE — 77412 RADIATION TX DELIVERY LVL 3: CPT | Performed by: RADIOLOGY

## 2025-01-03 PROCEDURE — 77387 GUIDANCE FOR RADJ TX DLVR: CPT | Performed by: RADIOLOGY

## 2025-01-03 PROCEDURE — 77307 TELETHX ISODOSE PLAN CPLX: CPT | Performed by: RADIOLOGY

## 2025-01-03 PROCEDURE — 77334 RADIATION TREATMENT AID(S): CPT | Performed by: RADIOLOGY

## 2025-01-06 ENCOUNTER — APPOINTMENT (OUTPATIENT)
Dept: RADIATION ONCOLOGY | Facility: CLINIC | Age: 55
End: 2025-01-06
Payer: COMMERCIAL

## 2025-01-06 PROCEDURE — 77412 RADIATION TX DELIVERY LVL 3: CPT | Performed by: RADIOLOGY

## 2025-01-06 PROCEDURE — 77387 GUIDANCE FOR RADJ TX DLVR: CPT | Performed by: RADIOLOGY

## 2025-01-06 PROCEDURE — 77280 THER RAD SIMULAJ FIELD SMPL: CPT | Performed by: RADIOLOGY

## 2025-01-07 ENCOUNTER — OFFICE VISIT (OUTPATIENT)
Dept: RADIATION ONCOLOGY | Facility: CLINIC | Age: 55
End: 2025-01-07
Payer: COMMERCIAL

## 2025-01-07 ENCOUNTER — APPOINTMENT (OUTPATIENT)
Dept: RADIATION ONCOLOGY | Facility: CLINIC | Age: 55
End: 2025-01-07
Payer: COMMERCIAL

## 2025-01-07 VITALS
BODY MASS INDEX: 36.11 KG/M2 | RESPIRATION RATE: 16 BRPM | TEMPERATURE: 98.6 F | OXYGEN SATURATION: 98 % | HEART RATE: 74 BPM | SYSTOLIC BLOOD PRESSURE: 108 MMHG | DIASTOLIC BLOOD PRESSURE: 63 MMHG | WEIGHT: 217 LBS

## 2025-01-07 DIAGNOSIS — Z17.0 MALIGNANT NEOPLASM OF UPPER-INNER QUADRANT OF LEFT BREAST IN FEMALE, ESTROGEN RECEPTOR POSITIVE (H): Primary | ICD-10-CM

## 2025-01-07 DIAGNOSIS — C50.212 MALIGNANT NEOPLASM OF UPPER-INNER QUADRANT OF LEFT BREAST IN FEMALE, ESTROGEN RECEPTOR POSITIVE (H): Primary | ICD-10-CM

## 2025-01-07 PROCEDURE — 77412 RADIATION TX DELIVERY LVL 3: CPT | Performed by: RADIOLOGY

## 2025-01-07 PROCEDURE — 77387 GUIDANCE FOR RADJ TX DLVR: CPT | Performed by: RADIOLOGY

## 2025-01-07 ASSESSMENT — PAIN SCALES - GENERAL: PAINLEVEL_OUTOF10: NO PAIN (0)

## 2025-01-07 NOTE — PROGRESS NOTES
[facility] PHYSICIANS  SPECIALIZING IN BREAKTHROUGHS  Radiation Oncology    On Treatment Visit Note      aRnd Macario      Date: 2025   MRN: [id number]   : 1970  Diagnosis: L IDC w/lobular ER/MS+ HER2-      Reason for Visit:  On Radiation Treatment Visit     Treatment Summary to Date  Treatment Site: L breast + bst Current Dose: 3724/5256 cGy Fractions:       Chemotherapy  Chemo concurrent with radx?: No (Dr. Ybarra)    Subjective:     Having increased skin reaction with no peeling or pain.    Nursing ROS:   Nutrition Alteration  Diet Type: Patient's Preference  Skin  Skin Reaction: 1 - Faint erythema or dry desquamation (very faint - no desquamation)  Skin Intervention: patient confirms applying Aquaphor two times daily, may use neosporin to under L breast and axilla if peels.        Cardiovascular  Respiratory effort: 1 - Normal - without distress        Psychosocial  Psychosocial Note: Patient denies fatigue.  Pain Assessment  0-10 Pain Scale: 0      Objective:   /63 (BP Location: Left arm, Patient Position: Sitting, Cuff Size: Adult Large)   Pulse 74   Temp 98.6  F (37  C) (Oral)   Resp 16   Wt 98.4 kg (217 lb)   SpO2 98%   BMI 36.11 kg/m    Gen: Appears well, in no acute distress  Skin: Mild diffuse erythema over treatment field, no desquamation    Labs:  CBC RESULTS:   Recent Labs   Lab Test 24  1535   WBC 7.3   RBC 4.77   HGB 12.7   HCT 40.5   MCV 85   MCH 26.6   MCHC 31.4*   RDW 14.6        ELECTROLYTES:  Recent Labs   Lab Test 24  1535      POTASSIUM 4.2   CHLORIDE 102   UMANG 9.9   CO2 25   BUN 12.2   CR 0.66   GLC 93       Assessment:    Tolerating radiation therapy well.  All questions and concerns addressed.    Toxicities:  Dermatitis: Grade 1: Faint erythema or dry desquamation    Plan:   Continue current therapy.    Skin care per above.      Fieldglassiq chart and setup information reviewed  Ports checked    Medication Review  Med list reviewed  with patient?: Yes    Educational Topic Discussed  Education Instructions: Reviewed side effects of radiation therapy, skin cares, fatigue.        Shaggy Ann MD    Please do not send letter to referring physician.

## 2025-01-07 NOTE — PATIENT INSTRUCTIONS
Please contact [facility] RN with questions or concerns following today's appointment.    If you are a patient of Dr. Pruitt call: [phone]   If you are a patient of Dr. Ann call: [phone]    Please feel free to leave a detailed message if your call is not answered.    If your call is not received before 3:00 PM, it may not be returned until the following business day.    If you are receiving radiation treatment and need assistance after 3:00 PM or on the weekends, please call [phone] and ask to speak to the radiation oncologist on-call.    Thank you!    [facility] Radiation Oncology Nursing

## 2025-01-07 NOTE — LETTER
2025      Rand Macario  [address] [facility] [address]      Dear Colleague,    Thank you for referring your patient, Rand Macario, to the [facility] RADIATION ONCOLOGY [place]. Please see a copy of my visit note below.    [facility] PHYSICIANS  SPECIALIZING IN BREAKTHROUGHS  Radiation Oncology    On Treatment Visit Note      Rand Macario      Date: 2025   MRN: [id number]   : 1970  Diagnosis: L IDC w/lobular ER/VT+ HER2-      Reason for Visit:  On Radiation Treatment Visit     Treatment Summary to Date  Treatment Site: L breast + bst Current Dose: 3724/5256 cGy Fractions:       Chemotherapy  Chemo concurrent with radx?: No (Dr. Ybarra)    Subjective:     Having increased skin reaction with no peeling or pain.    Nursing ROS:   Nutrition Alteration  Diet Type: Patient's Preference  Skin  Skin Reaction: 1 - Faint erythema or dry desquamation (very faint - no desquamation)  Skin Intervention: patient confirms applying Aquaphor two times daily, may use neosporin to under L breast and axilla if peels.        Cardiovascular  Respiratory effort: 1 - Normal - without distress        Psychosocial  Psychosocial Note: Patient denies fatigue.  Pain Assessment  0-10 Pain Scale: 0      Objective:   /63 (BP Location: Left arm, Patient Position: Sitting, Cuff Size: Adult Large)   Pulse 74   Temp 98.6  F (37  C) (Oral)   Resp 16   Wt 98.4 kg (217 lb)   SpO2 98%   BMI 36.11 kg/m    Gen: Appears well, in no acute distress  Skin: Mild diffuse erythema over treatment field, no desquamation    Labs:  CBC RESULTS:   Recent Labs   Lab Test 24  1535   WBC 7.3   RBC 4.77   HGB 12.7   HCT 40.5   MCV 85   MCH 26.6   MCHC 31.4*   RDW 14.6        ELECTROLYTES:  Recent Labs   Lab Test 24  1535      POTASSIUM 4.2   CHLORIDE 102   UMANG 9.9   CO2 25   BUN 12.2   CR 0.66   GLC 93       Assessment:    Tolerating radiation therapy well.  All questions and concerns  addressed.    Toxicities:  Dermatitis: Grade 1: Faint erythema or dry desquamation    Plan:   Continue current therapy.    Skin care per above.      Mosaiq chart and setup information reviewed  Ports checked    Medication Review  Med list reviewed with patient?: Yes    Educational Topic Discussed  Education Instructions: Reviewed side effects of radiation therapy, skin cares, fatigue.        Shaggy Ann MD    Please do not send letter to referring physician.        Again, thank you for allowing me to participate in the care of your patient.        Sincerely,        KRYSTYNA Ann MD    Electronically signed

## 2025-01-08 ENCOUNTER — APPOINTMENT (OUTPATIENT)
Dept: RADIATION ONCOLOGY | Facility: CLINIC | Age: 55
End: 2025-01-08
Payer: COMMERCIAL

## 2025-01-08 PROCEDURE — 77387 GUIDANCE FOR RADJ TX DLVR: CPT | Performed by: RADIOLOGY

## 2025-01-08 PROCEDURE — 77427 RADIATION TX MANAGEMENT X5: CPT | Performed by: RADIOLOGY

## 2025-01-08 PROCEDURE — 77412 RADIATION TX DELIVERY LVL 3: CPT | Performed by: RADIOLOGY

## 2025-01-08 PROCEDURE — 77336 RADIATION PHYSICS CONSULT: CPT | Performed by: RADIOLOGY

## 2025-01-09 ENCOUNTER — APPOINTMENT (OUTPATIENT)
Dept: RADIATION ONCOLOGY | Facility: CLINIC | Age: 55
End: 2025-01-09
Payer: COMMERCIAL

## 2025-01-09 PROCEDURE — 77412 RADIATION TX DELIVERY LVL 3: CPT | Performed by: RADIOLOGY

## 2025-01-09 PROCEDURE — 77387 GUIDANCE FOR RADJ TX DLVR: CPT | Performed by: RADIOLOGY

## 2025-01-10 ENCOUNTER — APPOINTMENT (OUTPATIENT)
Dept: RADIATION ONCOLOGY | Facility: CLINIC | Age: 55
End: 2025-01-10
Payer: COMMERCIAL

## 2025-01-10 PROCEDURE — 77412 RADIATION TX DELIVERY LVL 3: CPT | Performed by: RADIOLOGY

## 2025-01-10 PROCEDURE — 77387 GUIDANCE FOR RADJ TX DLVR: CPT | Performed by: RADIOLOGY

## 2025-01-12 ENCOUNTER — HEALTH MAINTENANCE LETTER (OUTPATIENT)
Age: 55
End: 2025-01-12

## 2025-01-14 ENCOUNTER — OFFICE VISIT (OUTPATIENT)
Dept: RADIATION ONCOLOGY | Facility: CLINIC | Age: 55
End: 2025-01-14
Payer: COMMERCIAL

## 2025-01-14 VITALS
BODY MASS INDEX: 36.03 KG/M2 | RESPIRATION RATE: 16 BRPM | TEMPERATURE: 98.4 F | HEART RATE: 71 BPM | DIASTOLIC BLOOD PRESSURE: 77 MMHG | WEIGHT: 216.5 LBS | SYSTOLIC BLOOD PRESSURE: 114 MMHG | OXYGEN SATURATION: 100 %

## 2025-01-14 DIAGNOSIS — M81.8 OSTEOPOROSIS DUE TO AROMATASE INHIBITOR: ICD-10-CM

## 2025-01-14 DIAGNOSIS — M81.0 OSTEOPOROSIS WITHOUT CURRENT PATHOLOGICAL FRACTURE, UNSPECIFIED OSTEOPOROSIS TYPE: Primary | ICD-10-CM

## 2025-01-14 DIAGNOSIS — Z17.0 MALIGNANT NEOPLASM OF UPPER-INNER QUADRANT OF LEFT BREAST IN FEMALE, ESTROGEN RECEPTOR POSITIVE (H): Primary | ICD-10-CM

## 2025-01-14 DIAGNOSIS — T38.6X5A OSTEOPOROSIS DUE TO AROMATASE INHIBITOR: ICD-10-CM

## 2025-01-14 DIAGNOSIS — L58.9 RADIATION DERMATITIS: ICD-10-CM

## 2025-01-14 DIAGNOSIS — C50.212 MALIGNANT NEOPLASM OF UPPER-INNER QUADRANT OF LEFT BREAST IN FEMALE, ESTROGEN RECEPTOR POSITIVE (H): Primary | ICD-10-CM

## 2025-01-14 PROCEDURE — 99207 PR DROP WITH A PROCEDURE: CPT | Performed by: RADIOLOGY

## 2025-01-14 RX ORDER — HYDROCORTISONE 25 MG/G
OINTMENT TOPICAL 2 TIMES DAILY
Qty: 30 G | Refills: 1 | Status: SHIPPED | OUTPATIENT
Start: 2025-01-14

## 2025-01-14 ASSESSMENT — PAIN SCALES - GENERAL: PAINLEVEL_OUTOF10: NO PAIN (0)

## 2025-01-14 NOTE — LETTER
2025      Rand Macario  [address] [facility] [address]      Dear Colleague,    Thank you for referring your patient, Rand Macario, to the [facility] RADIATION ONCOLOGY [place]. Please see a copy of my visit note below.    [facility] PHYSICIANS  SPECIALIZING IN BREAKTHROUGHS  Radiation Oncology    On Treatment Visit Note      Rand Macario      Date: 1/15/2025   MRN: [id number]   : 1970  Diagnosis: L IDC w/lobular ER/[place]+ HER2-      Reason for Visit:  On Radiation Treatment Visit     Treatment Summary to Date  Treatment Site: L breast + bst Current Dose: 5006/5256 cGy Fractions:       Chemotherapy  Chemo concurrent with radx?: No (Dr. Ybarra)    Subjective:     Having expected side effects.  Has left breast discomfort not be relieved by otc hydrocortisone.    Nursing ROS:   Nutrition Alteration  Diet Type: Patient's Preference  Skin  Skin Reaction: 1 - Faint erythema or dry desquamation (very faint - no desquamation)  Skin Intervention: patient confirms applying Aquaphor two times daily, may use neosporin to under L breast and axilla if peels.        Cardiovascular  Respiratory effort: 1 - Normal - without distress        Psychosocial  Psychosocial Note: Patient denies fatigue.  Pain Assessment  0-10 Pain Scale: 0      Objective:   /77 (BP Location: Left arm, Patient Position: Sitting, Cuff Size: Adult Large)   Pulse 71   Temp 98.4  F (36.9  C) (Oral)   Resp 16   Wt 98.2 kg (216 lb 8 oz)   SpO2 100%   BMI 36.03 kg/m    Gen: Appears well, in no acute distress  Skin: moderate diffuse erythema over treatment field, skin is intact    Labs:  CBC RESULTS:   Recent Labs   Lab Test 24  1535   WBC 7.3   RBC 4.77   HGB 12.7   HCT 40.5   MCV 85   MCH 26.6   MCHC 31.4*   RDW 14.6        ELECTROLYTES:  Recent Labs   Lab Test 24  1535      POTASSIUM 4.2   CHLORIDE 102   UMANG 9.9   CO2 25   BUN 12.2   CR 0.66   GLC 93       Assessment:    Tolerating  Clinic Care Coordination Contact  Care Team Conversations    Team Contacts:     Mother/Guardian Mayda- [phone], [email or web address]     Father- Casa [phone], [email or web address]     Ashvin Qureshi, [facility] [phone]- [email or web address].    Kathy Gusman- CHANDRIKA RAMIREZ, [phone], [email or web address]    Call to Mom, she reports she does not feel safe taking pt home, when pt ran away she said she was running to get a gun, reports pt should be in level 6 facility but shes not. Gave [facility] contact info for CHANDRIKA RAMIREZ as well who was not on contact list. Gave availability for Care Conference if its needed.    Consulted team re CC, get update from CPS and then determine.     Call to [facility] CPS, report was made to crisis line early this morning looking for update on that report, was assigned to Lucy [phone].     Message to Lucy CPS looking for update regarding pt and to make sure you had contact info for ext care.    Call from Lucy, Dad is coming to pick patient up today, but not until 6pm. [facility] will notify team to prepare pt for discharge.    Notified ext care team and assigned [facility] that plan for pt to discharge today at 6pm with Dad.    Mirna Kwon [facility]  Clinic Care Coordination  Pronouns: she/her/hers  Transitions and Extended Care Clinics  [facility]  [email or web address]  [phone]   radiation therapy well.  All questions and concerns addressed.    Toxicities:  Fatigue: Grade 0: No toxicity  Pain: Grade 1: Mild pain  Dermatitis: Grade 2: Moderate to brisk erythema; patchy moist desquamation, mostly confined to skin folds and creases; moderate erythema, no desquamation    Plan:   Continue current therapy.    Pain: cold compresses and otc pain med  Script for hydrocortisone 2.5% ointment sent, recommend neosporin with pain relief when it peels  Follow up with Rad Onc RN next week for skin check      Mosaiq chart and setup information reviewed  Ports checked    Medication Review  Med list reviewed with patient?: Yes    Educational Topic Discussed  Education Instructions: Reviewed side effects of radiation therapy, skin cares, fatigue.        Shaggy Ann MD    Please do not send letter to referring physician.        Again, thank you for allowing me to participate in the care of your patient.        Sincerely,        KRYSTYNA Ann MD    Electronically signed

## 2025-01-14 NOTE — PROGRESS NOTES
[facility] PHYSICIANS  SPECIALIZING IN BREAKTHROUGHS  Radiation Oncology    On Treatment Visit Note      Rand Macario      Date: 1/15/2025   MRN: [id number]   : 1970  Diagnosis: L IDC w/lobular ER/[place]+ HER2-      Reason for Visit:  On Radiation Treatment Visit     Treatment Summary to Date  Treatment Site: L breast + bst Current Dose: 5006/5256 cGy Fractions:       Chemotherapy  Chemo concurrent with radx?: No (Dr. Ybarra)    Subjective:     Having expected side effects.  Has left breast discomfort not be relieved by otc hydrocortisone.    Nursing ROS:   Nutrition Alteration  Diet Type: Patient's Preference  Skin  Skin Reaction: 1 - Faint erythema or dry desquamation (very faint - no desquamation)  Skin Intervention: patient confirms applying Aquaphor two times daily, may use neosporin to under L breast and axilla if peels.        Cardiovascular  Respiratory effort: 1 - Normal - without distress        Psychosocial  Psychosocial Note: Patient denies fatigue.  Pain Assessment  0-10 Pain Scale: 0      Objective:   /77 (BP Location: Left arm, Patient Position: Sitting, Cuff Size: Adult Large)   Pulse 71   Temp 98.4  F (36.9  C) (Oral)   Resp 16   Wt 98.2 kg (216 lb 8 oz)   SpO2 100%   BMI 36.03 kg/m    Gen: Appears well, in no acute distress  Skin: moderate diffuse erythema over treatment field, skin is intact    Labs:  CBC RESULTS:   Recent Labs   Lab Test 24  1535   WBC 7.3   RBC 4.77   HGB 12.7   HCT 40.5   MCV 85   MCH 26.6   MCHC 31.4*   RDW 14.6        ELECTROLYTES:  Recent Labs   Lab Test 24  1535      POTASSIUM 4.2   CHLORIDE 102   UMANG 9.9   CO2 25   BUN 12.2   CR 0.66   GLC 93       Assessment:    Tolerating radiation therapy well.  All questions and concerns addressed.    Toxicities:  Fatigue: Grade 0: No toxicity  Pain: Grade 1: Mild pain  Dermatitis: Grade 2: Moderate to brisk erythema; patchy moist desquamation, mostly confined to skin folds and  creases; moderate erythema, no desquamation    Plan:   Continue current therapy.    Pain: cold compresses and otc pain med  Script for hydrocortisone 2.5% ointment sent, recommend neosporin with pain relief when it peels  Follow up with Rad Onc RN next week for skin check      Mosaiq chart and setup information reviewed  Ports checked    Medication Review  Med list reviewed with patient?: Yes    Educational Topic Discussed  Education Instructions: Reviewed side effects of radiation therapy, skin cares, fatigue.        Shaggy Ann MD    Please do not send letter to referring physician.

## 2025-01-15 NOTE — PROGRESS NOTES
I called patient to try to schedule DEXA scan before I see her on 1/24/2025 to discuss postradiation adjuvant hormone therapy for her localized invasive breast cancer.    Haile Ybarra MD

## 2025-01-16 ENCOUNTER — DOCUMENTATION ONLY (OUTPATIENT)
Dept: RADIATION ONCOLOGY | Facility: CLINIC | Age: 55
End: 2025-01-16
Payer: COMMERCIAL

## 2025-01-16 NOTE — PROGRESS NOTES
Radiotherapy Treatment Summary              PATIENT: Rand Macario  MEDICAL RECORD NO: [id number]   : 1970    DIAGNOSIS / ID: 54 year old woman with left (UIQ) breast cancer, status post lumpectomy and SLNBx, revealing G2 ILCA, [id number] ER+/ND+/H2N- disease who recently completed adjuvant radiation therapy as stated below. She will not be receiving chemotherapy      INTENT OF RADIOTHERAPY: Adjuvant    CONCURRENT SYSTEMIC THERAPY: No             SITE OF TREATMENT:  left breast    DATES  OF TREATMENT: 24 to 1/15/25    TOTAL DOSE OF TREATMENT / FRACTIONS: 4256cGy total dose to the breast in 266cGy daily fractions followed by 1000cGy total dose to the tumor cavity given in 250cGy daily fractions                TOXICITY: No unexpected radiation induced toxicity to report.  She had discomfort or pain within the treatment field and was asked to use cold compresses and otc pain medication.  During her last week of treatment she was given a prescription for hydrocortisone 2.5% ointment for G2 skin erythema.  She was asked to use neosporin with pain relief if her skin peels.                              FOLLOW UP PLAN:  Follow up with Rad Onc RN next week for skin check Follow up with Radiation Oncology in 1 month with NP per protocol   Follow up with Medical Oncology as previously directed    CC  Patient Care Team:  Estrellita Carlisle PA-C as PCP - General (Physician Assistant)  Pascual Aggarwal AuD (Audiology)  Radha Sood Brenda Aika, MD as MD (Radiation Oncology)  Sarbjit Ybarra MD as Physician (Hematology & Oncology)  Carrie Ann MD as Assigned Cancer Care Provider       Shaggy Ann M.D.  Department of Radiation Oncology  [facility]

## 2025-01-21 ENCOUNTER — ANCILLARY PROCEDURE (OUTPATIENT)
Dept: BONE DENSITY | Facility: CLINIC | Age: 55
End: 2025-01-21
Attending: INTERNAL MEDICINE
Payer: COMMERCIAL

## 2025-01-21 ENCOUNTER — ALLIED HEALTH/NURSE VISIT (OUTPATIENT)
Dept: RADIATION ONCOLOGY | Facility: CLINIC | Age: 55
End: 2025-01-21
Payer: COMMERCIAL

## 2025-01-21 DIAGNOSIS — Z17.0 MALIGNANT NEOPLASM OF UPPER-INNER QUADRANT OF LEFT BREAST IN FEMALE, ESTROGEN RECEPTOR POSITIVE (H): Primary | ICD-10-CM

## 2025-01-21 DIAGNOSIS — M81.0 OSTEOPOROSIS WITHOUT CURRENT PATHOLOGICAL FRACTURE, UNSPECIFIED OSTEOPOROSIS TYPE: ICD-10-CM

## 2025-01-21 DIAGNOSIS — T38.6X5A OSTEOPOROSIS DUE TO AROMATASE INHIBITOR: ICD-10-CM

## 2025-01-21 DIAGNOSIS — C50.212 MALIGNANT NEOPLASM OF UPPER-INNER QUADRANT OF LEFT BREAST IN FEMALE, ESTROGEN RECEPTOR POSITIVE (H): Primary | ICD-10-CM

## 2025-01-21 DIAGNOSIS — M81.8 OSTEOPOROSIS DUE TO AROMATASE INHIBITOR: ICD-10-CM

## 2025-01-21 PROCEDURE — 99207 PR NO CHARGE LOS: CPT

## 2025-01-21 PROCEDURE — 77080 DXA BONE DENSITY AXIAL: CPT | Performed by: RADIOLOGY

## 2025-01-21 NOTE — NURSING NOTE
Patient here today for an RN skin assessment since completing radiation therapy to the L breast on 1/15/25. Patient denies pain, reports itching to the radiation site. She is using Hydrocortisone 2.5% 2 x day and Aquaphor 2-3 x day. Erythema to entire radiation site noted and slight rash to left mid chest. There is a small area of dry desquamation to her L axilla. Patient to continue Hydrocortisone 2.5% to radiation site for up to 1 more week, then stop. We reviewed to not apply hydrocortisone to open areas, apply neosporin to L axilla and to any areas that may peel. She is to continue Aquaphor until skin has healed completely. Continue cool packs, may place Aquaphor in the fridge and then apply. Follow up with Dr. Ybarra on 1/24/25. Follow up with Nandini Gillis NP on 2/11/25. Patient to call if she needs a refill of Hydrocortisone or with any questions or concerns.    Ellen Rider RN

## 2025-02-05 LAB
PATH REPORT.COMMENTS IMP SPEC: ABNORMAL
PATH REPORT.COMMENTS IMP SPEC: YES
PATH REPORT.COMMENTS IMP SPEC: YES
PATH REPORT.FINAL DX SPEC: ABNORMAL
PATH REPORT.FINAL DX SPEC: ABNORMAL
PATH REPORT.GROSS SPEC: ABNORMAL
PATH REPORT.GROSS SPEC: ABNORMAL
PATH REPORT.MICROSCOPIC SPEC OTHER STN: ABNORMAL
PATH REPORT.MICROSCOPIC SPEC OTHER STN: ABNORMAL
PATH REPORT.RELEVANT HX SPEC: ABNORMAL
PATH REPORT.SITE OF ORIGIN SPEC: ABNORMAL
PATH REPORT.SITE OF ORIGIN SPEC: ABNORMAL

## 2025-02-11 ENCOUNTER — VIRTUAL VISIT (OUTPATIENT)
Dept: RADIATION ONCOLOGY | Facility: CLINIC | Age: 55
End: 2025-02-11
Payer: COMMERCIAL

## 2025-02-11 DIAGNOSIS — Z17.0 MALIGNANT NEOPLASM OF UPPER-INNER QUADRANT OF LEFT BREAST IN FEMALE, ESTROGEN RECEPTOR POSITIVE (H): Primary | ICD-10-CM

## 2025-02-11 DIAGNOSIS — C50.212 MALIGNANT NEOPLASM OF UPPER-INNER QUADRANT OF LEFT BREAST IN FEMALE, ESTROGEN RECEPTOR POSITIVE (H): Primary | ICD-10-CM

## 2025-02-11 PROCEDURE — 99024 POSTOP FOLLOW-UP VISIT: CPT

## 2025-02-11 NOTE — PROGRESS NOTES
Radiation Oncology Follow-up Visit  2025        Rand Macario  MRN: [id number]   : 1970       RADIATION ONCOLOGIST: Dr. Carrie Ann  MEDICAL ONCOLOGIST: Dr. Ybarra   SURGICAL ONCOLOGIST: Dr. Sood       DISEASE TREATED:   left (UIQ) breast cancer,     RADIATION THERAPY DELIVERED:   4256cGy total dose to the breast in 266cGy daily fractions followed by 1000cGy total dose to the tumor cavity given in 250cGy daily fractions     INTERVAL SINCE COMPLETION OF RADIATION THERAPY:   1 month (24 to 1/15/2025)     SUBJECTIVE:   Rand Macario is a 54 year old female, recently diagnosed with left (UIQ) breast cancer, status post lumpectomy and SLNBx, revealing G2 ILCA, [id number] ER+/SD+/H2N- disease who recently completed adjuvant radiation therapy as stated below. She will not be receiving chemotherapy who is here today for routine follow up after completing radiation therapy via virtual visit.      HISTORY OF PRESENT ILLNESS:  Rand Macario was in her usual state of health earlier this year. On 24 she had bilateral screening mmg that showed left breast UIQ focal asymmetry.  The other breast was unremarkable.  There was no diagnostic mmg completed.  US guided biopsy of the left breast on 10/9/24 showed G2 IDCA w/ no LVSI/DCIS, ER+/SD+/Her2-.  On 10/31/24 Dr. Sood took her to the OR and she had a left breast lumpectomy and SLNBx.  Pathology revealed single focus 1.0cm of grade 2 ILCa. No DCIS or LVSI. +LCIS. All invasive margins were negative; invasive tumor is 0.4 cm from nearest (anterior) margin.  +0/2 involved lymph nodes.  ER+/SD+/HER2-.  Specimen radiograph was completed. The imaged specimen includes the lesion, radioactive pellet, and biopsy clip.  Her postoperative course has been unenventful.    She was seen by Dr. Ybarra.  Her Oncotype score returned back as 10. The benefit of treatment did not outweigh the risks and no systemic chemotherapy is recommended.  She is to  receive adjuvant endocrine therapy after XRT.      She was seen by Dr. Ann at  Radiation Oncology clinic 12/04/2024 for consideration of postoperative radiotherapy.    INTERVAL HISTORY:  Today, patient is seen for 1 month routine follow-up after her RT. She has seen healing of her skin reaction and continues to moisturize using aquaphor. Her skin is healing nicely, intermittent itchiness on the surgical site that relieve with some neosporin. She has continued to heal well and denies any significant pain after her surgery and RT.  She has good ROM.  She denies any other new masses, skin changes, shortness of breath, chest or bony pain, or new neurologic symptoms. Doesn't notice any stiffness. Denies any pain or swelling. Fatigue is resolved, appetite is great. .  She has seen medical oncology last 1/24/2025. She was started with Anastrozole about 3 weeks ago and reported no side-effects.     ROS:  Complete review of systems is negative except for symptoms discussed in subjective above.    Current Outpatient Medications   Medication Sig Dispense Refill    acetaminophen (TYLENOL) 500 MG tablet Take 500-1,000 mg by mouth every 6 hours as needed.      anastrozole (ARIMIDEX) 1 MG tablet Take 1 tablet (1 mg) by mouth daily. 90 tablet 3    atorvastatin (LIPITOR) 40 MG tablet       empagliflozin (JARDIANCE) 25 MG TABS tablet       fluticasone (FLONASE) 50 MCG/ACT nasal spray Spray 1 spray into both nostrils as needed for allergies.      hydrocortisone 2.5 % ointment Apply topically 2 times daily. to radiation treatment field.  Stop application 2 weeks after completing treatment. 30 g 1    losartan (COZAAR) 25 MG tablet Take 50 mg by mouth.      metFORMIN (GLUCOPHAGE) 500 MG tablet Take 500 mg by mouth.      naltrexone-bupropion (CONTRAVE) 8-90 MG per 12 hr tablet       omeprazole (PRILOSEC) 20 MG DR capsule Take 20 mg by mouth.      RYBELSUS 14 MG tablet TAKE 1 TABLET BY MOUTH DAILY 30 MINUTES BEFORE FIRST FOOD OR  BEVERAGE OR MEDICINE OF THE DAY       No current facility-administered medications for this visit.      No Known Allergies    Past Medical History:   Diagnosis Date    Diabetes mellitus, type 2 (H) 12/04/2024    GERD (gastroesophageal reflux disease) 06/11/2008    Headache 06/11/2008    High blood pressure associated with diabetes (H) 12/04/2024    Meningitis 06/11/2008    Obesity due to excess calories 10/31/2024     PHYSICAL EXAM:  There were no vitals taken for this visit.  There were no heights and weight for this visit.  Gen: Alert, not in acute distress.  HEENT: Normocephalic, atraumatic. No visible scleral icterus.   Neck: Apparent, full range of motion.  CV: Appears well perfused, with no visible cyanosis.  Lungs: Breathing easily in room air, with no difficulty completing full sentences.   Extremities: No visible edema of the upper extremities. Full ROM at the shoulders and elbows.  Neuro: Alert and oriented, grossly non focal. Normal speech. Moving upper extremities equally.  Skin: No visible jaundice. No suspicious lesions of the visualized integuments.  Psychiatric: Appropriate mood and affect to given situation. Answers questions appropriately.     IMAGING:   No new imaging.      LABS:  Reviewed.    IMPRESSION:   Ms. Macario is a 54 year old female with a left (UIQ) breast cancer s/p 1 month out from radiation and doing well. She was last seen in our clinic during her last week of treatment. While on treatment she had no complaints of mild fatigue, but developed diffuse brisk erythema (grade 2).  Post treatment she states that all of her symptoms have resolved. She returns today in follow up and has no complaints. She should continue to use sunblock and moisturizer in the previously treated area generously.     PLAN:   We discussed continued moisturization as the skin post-radiations tends to be dry. She may use any product she desires. We also discussed avoiding exposing irradiated area to sun with  either a sunscreen or protective clothing. Discussed long term care with continued moisturizing of the treated breast, stretching and range of motion exercises, and sun screen.  She will primarily follow up with medical oncology for surveillance imaging and routine exam. Including hormonal treatment/systemic therapy management.  Patient will follow-up with Surgical Oncology as scheduled:  Patient will follow-up with Radiation Oncology as needed  Discussed with the patient healthy lifestyle:  Discussed to come in or call with any concerns or questions that may develop.  -Physical activity for a minimum of 150 minutes/week with a goal of 300 minutes/week.  -Healthy diet including lots of fruits and vegetables.  -Maintain healthy weight.  -Do not use tobacco products.  -Be sure to get adequate amount of sleep  -Consume alcohol rarely or not at all  -See primary care provider yearly for routine age related screenings and immunization.  Discussed with the patient regarding continue SBE.    I appreciate the opportunity to participate in Ms Macario's care. Ms. Macario is encouraged to contact us with questions or concerns should they arise.     Laboratory data, imaging and pathology as noted above are reviewed. I reviewed previous medical records, which are summarized in the HPI. A total of 20 minutes were spent (including face to face time) during this visit and more than 50% of the time was spent in counseling and coordination of care.      Virtual Visit Details  Type of service:  Video Visit   Joined the call at February 11, 2025, 2:57 PM  Left the call at February 11, 2025, 3:05 PM  You were on the call for 8 minutes  Originating Location (pt. Location): Home    Distant Location (provider location):  On-site  Platform used for Video Visit: ANGEL Jackson, CNP  Department of Radiation Oncology  [facility]     CC  Patient Care Team:  Estrellita Carlisle PA-C as PCP - General (Physician  Assistant)  Pascual Aggarwal AuD (Audiology)  Radha Sood Brenda Aika, MD as MD (Radiation Oncology)  Sarbjit Ybarra MD as Physician (Hematology & Oncology)  Carrie Servin MD as Assigned Cancer Care Provider  CARRIE SERVIN

## 2025-02-20 ENCOUNTER — MEDICAL CORRESPONDENCE (OUTPATIENT)
Dept: HEALTH INFORMATION MANAGEMENT | Facility: CLINIC | Age: 55
End: 2025-02-20
Payer: COMMERCIAL

## 2025-03-19 ENCOUNTER — OFFICE VISIT (OUTPATIENT)
Dept: OPHTHALMOLOGY | Facility: CLINIC | Age: 55
End: 2025-03-19
Payer: COMMERCIAL

## 2025-03-19 DIAGNOSIS — H02.403 INVOLUTIONAL PTOSIS, ACQUIRED, BILATERAL: ICD-10-CM

## 2025-03-19 DIAGNOSIS — H02.831 DERMATOCHALASIS OF BOTH UPPER EYELIDS: Primary | ICD-10-CM

## 2025-03-19 DIAGNOSIS — H02.834 DERMATOCHALASIS OF BOTH UPPER EYELIDS: Primary | ICD-10-CM

## 2025-03-19 ASSESSMENT — VISUAL ACUITY
METHOD: SNELLEN - LINEAR
OD_CC: 20/20
CORRECTION_TYPE: CONTACTS
OS_CC: 20/50

## 2025-03-19 ASSESSMENT — TONOMETRY
OS_IOP_MMHG: 20
IOP_METHOD: ICARE
OD_IOP_MMHG: 18

## 2025-03-19 ASSESSMENT — SLIT LAMP EXAM - LIDS
COMMENTS: HEAVY DERMATOCHALASIS RESTING ON LASHES, TRUE PTOSIS
COMMENTS: HEAVY DERMATOCHALASIS RESTING ON LASHES, TRUE PTOSIS

## 2025-03-19 ASSESSMENT — CONF VISUAL FIELD: COMMENTS: TANGENT VISUAL FIELD PERFORMED TODAY.

## 2025-03-19 NOTE — NURSING NOTE
Chief Complaints and History of Present Illnesses   Patient presents with    Droopy Eye Lid Evaluation       Chief Complaint(s) and History of Present Illness(es)       Droopy Eye Lid Evaluation              Laterality: right upper lid and left upper lid              Comments    Patient referred by Dr. Granger for dermatochalasis evaluation. Pt states she had eval at [facility] previously but did not qualify. Pt feels lids have become worse in the last 5 years. Pt notes loss of peripheral vision and lid heaviness. Uses Blink tears before putting ctls.     Pt is borderline diabetic.                   Tegan Rascon, COT

## 2025-03-19 NOTE — PROGRESS NOTES
Oculoplastic Clinic New Patient    Patient: Rand Macario MRN# [id number]   YOB: 1970 Age: 54 year old   Date of Visit: Mar 19, 2025    CC: Droopy eyelids obstructing vision.              HPI:     Chief Complaint(s) and History of Present Illness(es)     Droopy Eye Lid Evaluation            Laterality: right upper lid and left upper lid          Comments    Patient referred by Dr. Granger for dermatochalasis evaluation. Pt states   she had eval at [facility] previously but did not qualify. Pt feels lids   have become worse in the last 5 years. Pt notes loss of peripheral vision   and lid heaviness. Uses Blink tears before putting ctls.     Pt is borderline diabetic.            Rand Macario is a 54 year old female who has noted gradual onset of droopy eyelids over the past years. The droopy eyelid is interfering with activities of daily living including driving, and reading. The patient denies double vision, variability of the eyelid position.  EXAM:     MRD1: 1 mm both eyes   Dermatochalasis with excess skin touching eyelashes and aponeurotic ptosis a bit worse on the left.       VISUAL FIELD ([place]):  Right eye untaped:15 degrees Right eye taped:40 degrees   25 degree improvement  Left eye untaped:15 degrees Left eye taped:40 degrees    25 degree improvement    Smoking: Non-smoker    Assessment & Plan     Rand Macario is a 54 year old female with the following diagnoses:   1. Dermatochalasis of both upper eyelids    2. Involutional ptosis, acquired, bilateral          Both upper eyelid blepharoplasty (SNCF) 27202  Bilateral ptosis repair external levator approach (left is a bit lower) 20826    Also considering BLLB (TCFx, possible small sp, closed canthopexy) would have to be MAC.    The heavy upper eyelids are causing symptoms as documented above. The condition is not secondary to underlying Sjogren's, myasthenia gravis, or polymyositis.   ANTICOAGULATION:  None    She has recently been treated  with lumpectomy SLNBx followed by adjuvant radiation for left breast cancer.          PHOTOS DEMONSTRATE:    Significant dermatochalasis with lids resting on eyelashes and obstructing visual axis  Blepharoptosis    Attending Physician Attestation: Complete documentation of historical and exam elements from today's encounter can be found in the full encounter summary report (not reduplicated in this progress note). I personally obtained the chief complaint(s) and history of present illness. I confirmed and edited as necessary the review of systems, past medical/surgical history, family history, social history, and examination findings as documented by others; and I examined the patient myself. I personally reviewed the relevant tests, images, and reports as documented above. I formulated and edited as necessary the assessment and plan and discussed the findings and management plan with the patient. Adele Myles MD      Today with Rand Macario I reviewed the indications, risks, benefits, and alternatives of the proposed surgical procedure including, but not limited to, failure obtain the desired result  and need for additional surgery, bleeding, infection, loss of vision, or injury to the eye, dry eyes, and the remote possibility of permanent damage to any organ system or death with the use of anesthesia.  I provided multiple opportunities for the questions, answered all questions to the best of my ability, and confirmed that my answers and my discussion were understood.

## 2025-03-19 NOTE — LETTER
3/19/2025         RE:  :  MRN: Rand Macario  1970  [id number]     Dear Dr. Granger,    Thank you for asking me to see your patient, Rand Macario, for an oculoplastic   consultation.  My assessment and plan are below.  For further details, please see my attached clinic note.           HPI:     Chief Complaint(s) and History of Present Illness(es)     Droopy Eye Lid Evaluation            Laterality: right upper lid and left upper lid          Comments    Patient referred by Dr. Granger for dermatochalasis evaluation. Pt states   she had eval at [facility] previously but did not qualify. Pt feels lids   have become worse in the last 5 years. Pt notes loss of peripheral vision   and lid heaviness. Uses Blink tears before putting ctls.     Pt is borderline diabetic.            Rand Macario is a 54 year old female who has noted gradual onset of droopy eyelids over the past years. The droopy eyelid is interfering with activities of daily living including driving, and reading. The patient denies double vision, variability of the eyelid position.  EXAM:     MRD1: 1 mm both eyes   Dermatochalasis with excess skin touching eyelashes and aponeurotic ptosis a bit worse on the left.       VISUAL FIELD ([place]):  Right eye untaped:15 degrees Right eye taped:40 degrees   25 degree improvement  Left eye untaped:15 degrees Left eye taped:40 degrees    25 degree improvement    Smoking: Non-smoker    Assessment & Plan     Rand Macario is a 54 year old female with the following diagnoses:   1. Dermatochalasis of both upper eyelids    2. Involutional ptosis, acquired, bilateral          Both upper eyelid blepharoplasty (SNCF) 28021  Bilateral ptosis repair external levator approach (left is a bit lower) 78391    Also considering BLLB (TCFx, possible small sp, closed canthopexy) would have to be MAC.    The heavy upper eyelids are causing symptoms as documented above. The condition is not secondary to underlying Sjogren's,  myasthenia gravis, or polymyositis.   ANTICOAGULATION:  None    She has recently been treated with lumpectomy SLNBx followed by adjuvant radiation for left breast cancer.         Again, thank you for allowing me to participate in the care of your patient.      Sincerely,    Adele Myles MD  Department of Ophthalmology and Visual Neurosciences  [facility]    CC: Domingo Granger, AKI  [facility]  [id number] [place] [phone]  Via Fax: [phone]

## 2025-03-21 PROBLEM — H02.831 DERMATOCHALASIS OF BOTH UPPER EYELIDS: Status: ACTIVE | Noted: 2025-03-19

## 2025-03-21 PROBLEM — H02.834 DERMATOCHALASIS OF BOTH UPPER EYELIDS: Status: ACTIVE | Noted: 2025-03-19

## 2025-04-26 ENCOUNTER — HEALTH MAINTENANCE LETTER (OUTPATIENT)
Age: 55
End: 2025-04-26

## 2025-07-18 ENCOUNTER — ANESTHESIA EVENT (OUTPATIENT)
Dept: SURGERY | Facility: AMBULATORY SURGERY CENTER | Age: 55
End: 2025-07-18
Payer: COMMERCIAL

## 2025-07-21 ENCOUNTER — HOSPITAL ENCOUNTER (OUTPATIENT)
Facility: AMBULATORY SURGERY CENTER | Age: 55
Discharge: HOME | End: 2025-07-21
Attending: OPHTHALMOLOGY | Admitting: OPHTHALMOLOGY
Payer: COMMERCIAL

## 2025-07-21 ENCOUNTER — ANESTHESIA (OUTPATIENT)
Dept: SURGERY | Facility: AMBULATORY SURGERY CENTER | Age: 55
End: 2025-07-21
Payer: COMMERCIAL

## 2025-07-21 VITALS
DIASTOLIC BLOOD PRESSURE: 79 MMHG | RESPIRATION RATE: 14 BRPM | BODY MASS INDEX: 35.87 KG/M2 | TEMPERATURE: 98.8 F | WEIGHT: 223.2 LBS | SYSTOLIC BLOOD PRESSURE: 129 MMHG | HEIGHT: 66 IN | OXYGEN SATURATION: 98 % | HEART RATE: 74 BPM

## 2025-07-21 DIAGNOSIS — H02.831 DERMATOCHALASIS OF BOTH UPPER EYELIDS: Primary | ICD-10-CM

## 2025-07-21 DIAGNOSIS — H02.834 DERMATOCHALASIS OF BOTH UPPER EYELIDS: Primary | ICD-10-CM

## 2025-07-21 LAB — GLUCOSE BLDC GLUCOMTR-MCNC: 112 MG/DL (ref 70–99)

## 2025-07-21 PROCEDURE — 96999 UNLISTED SPEC DERM SVC/PX: CPT | Performed by: OPHTHALMOLOGY

## 2025-07-21 PROCEDURE — 67904 REPAIR EYELID DEFECT: CPT | Mod: 50 | Performed by: OPHTHALMOLOGY

## 2025-07-21 RX ORDER — ONDANSETRON 2 MG/ML
4 INJECTION INTRAMUSCULAR; INTRAVENOUS EVERY 30 MIN PRN
Status: DISCONTINUED | OUTPATIENT
Start: 2025-07-21 | End: 2025-07-22 | Stop reason: HOSPADM

## 2025-07-21 RX ORDER — OXYCODONE HYDROCHLORIDE 5 MG/1
10 TABLET ORAL
Status: DISCONTINUED | OUTPATIENT
Start: 2025-07-21 | End: 2025-07-22 | Stop reason: HOSPADM

## 2025-07-21 RX ORDER — PROPOFOL 10 MG/ML
INJECTION, EMULSION INTRAVENOUS PRN
Status: DISCONTINUED | OUTPATIENT
Start: 2025-07-21 | End: 2025-07-21

## 2025-07-21 RX ORDER — LIDOCAINE HYDROCHLORIDE 20 MG/ML
JELLY TOPICAL PRN
Status: DISCONTINUED | OUTPATIENT
Start: 2025-07-21 | End: 2025-07-21 | Stop reason: HOSPADM

## 2025-07-21 RX ORDER — FENTANYL CITRATE 50 UG/ML
INJECTION, SOLUTION INTRAMUSCULAR; INTRAVENOUS PRN
Status: DISCONTINUED | OUTPATIENT
Start: 2025-07-21 | End: 2025-07-21

## 2025-07-21 RX ORDER — LIDOCAINE HYDROCHLORIDE 20 MG/ML
INJECTION, SOLUTION INFILTRATION; PERINEURAL PRN
Status: DISCONTINUED | OUTPATIENT
Start: 2025-07-21 | End: 2025-07-21

## 2025-07-21 RX ORDER — ONDANSETRON 4 MG/1
4 TABLET, ORALLY DISINTEGRATING ORAL EVERY 30 MIN PRN
Status: DISCONTINUED | OUTPATIENT
Start: 2025-07-21 | End: 2025-07-22 | Stop reason: HOSPADM

## 2025-07-21 RX ORDER — LIDOCAINE 40 MG/G
CREAM TOPICAL
Status: DISCONTINUED | OUTPATIENT
Start: 2025-07-21 | End: 2025-07-22 | Stop reason: HOSPADM

## 2025-07-21 RX ORDER — NEOMYCIN POLYMYXIN B SULFATES AND DEXAMETHASONE 3.5; 10000; 1 MG/ML; [USP'U]/ML; MG/ML
SUSPENSION/ DROPS OPHTHALMIC
Qty: 5 ML | Refills: 0 | Status: SHIPPED | OUTPATIENT
Start: 2025-07-21

## 2025-07-21 RX ORDER — OXYCODONE HYDROCHLORIDE 5 MG/1
5 TABLET ORAL
Status: DISCONTINUED | OUTPATIENT
Start: 2025-07-21 | End: 2025-07-22 | Stop reason: HOSPADM

## 2025-07-21 RX ORDER — NALOXONE HYDROCHLORIDE 0.4 MG/ML
0.1 INJECTION, SOLUTION INTRAMUSCULAR; INTRAVENOUS; SUBCUTANEOUS
Status: DISCONTINUED | OUTPATIENT
Start: 2025-07-21 | End: 2025-07-22 | Stop reason: HOSPADM

## 2025-07-21 RX ORDER — SODIUM CHLORIDE, SODIUM LACTATE, POTASSIUM CHLORIDE, CALCIUM CHLORIDE 600; 310; 30; 20 MG/100ML; MG/100ML; MG/100ML; MG/100ML
INJECTION, SOLUTION INTRAVENOUS CONTINUOUS
Status: DISCONTINUED | OUTPATIENT
Start: 2025-07-21 | End: 2025-07-22 | Stop reason: HOSPADM

## 2025-07-21 RX ORDER — ACETAMINOPHEN 325 MG/1
975 TABLET ORAL ONCE
Status: COMPLETED | OUTPATIENT
Start: 2025-07-21 | End: 2025-07-21

## 2025-07-21 RX ORDER — DEXAMETHASONE SODIUM PHOSPHATE 4 MG/ML
4 INJECTION, SOLUTION INTRA-ARTICULAR; INTRALESIONAL; INTRAMUSCULAR; INTRAVENOUS; SOFT TISSUE
Status: DISCONTINUED | OUTPATIENT
Start: 2025-07-21 | End: 2025-07-22 | Stop reason: HOSPADM

## 2025-07-21 RX ORDER — ERYTHROMYCIN 5 MG/G
OINTMENT OPHTHALMIC PRN
Status: DISCONTINUED | OUTPATIENT
Start: 2025-07-21 | End: 2025-07-21 | Stop reason: HOSPADM

## 2025-07-21 RX ORDER — ERYTHROMYCIN 5 MG/G
OINTMENT OPHTHALMIC
Qty: 3.5 G | Refills: 0 | Status: SHIPPED | OUTPATIENT
Start: 2025-07-21

## 2025-07-21 RX ORDER — TETRACAINE HYDROCHLORIDE 5 MG/ML
SOLUTION OPHTHALMIC PRN
Status: DISCONTINUED | OUTPATIENT
Start: 2025-07-21 | End: 2025-07-21 | Stop reason: HOSPADM

## 2025-07-21 RX ADMIN — PROPOFOL 30 MG: 10 INJECTION, EMULSION INTRAVENOUS at 09:28

## 2025-07-21 RX ADMIN — PROPOFOL 100 MCG/KG/MIN: 10 INJECTION, EMULSION INTRAVENOUS at 09:34

## 2025-07-21 RX ADMIN — LIDOCAINE HYDROCHLORIDE 60 MG: 20 INJECTION, SOLUTION INFILTRATION; PERINEURAL at 09:25

## 2025-07-21 RX ADMIN — FENTANYL CITRATE 50 MCG: 50 INJECTION, SOLUTION INTRAMUSCULAR; INTRAVENOUS at 09:17

## 2025-07-21 RX ADMIN — PROPOFOL 20 MG: 10 INJECTION, EMULSION INTRAVENOUS at 09:27

## 2025-07-21 RX ADMIN — PROPOFOL 50 MG: 10 INJECTION, EMULSION INTRAVENOUS at 09:26

## 2025-07-21 RX ADMIN — SODIUM CHLORIDE, SODIUM LACTATE, POTASSIUM CHLORIDE, CALCIUM CHLORIDE: 600; 310; 30; 20 INJECTION, SOLUTION INTRAVENOUS at 07:52

## 2025-07-21 RX ADMIN — ACETAMINOPHEN 975 MG: 325 TABLET ORAL at 07:42

## 2025-07-21 NOTE — ANESTHESIA POSTPROCEDURE EVALUATION
Patient: Rand Macario    Procedure: Procedure(s):  Both upper eyelid blepharoplasty and ptosis repair  Bilateral cosmetic lower eyelid blepharoplasty       Anesthesia Type:  MAC    Note:  Disposition: Outpatient   Postop Pain Control: Uneventful            Sign Out: Well controlled pain   PONV: No   Neuro/Psych: Uneventful            Sign Out: Acceptable/Baseline neuro status   Airway/Respiratory: Uneventful            Sign Out: Acceptable/Baseline resp. status   CV/Hemodynamics: Uneventful            Sign Out: Acceptable CV status; No obvious hypovolemia; No obvious fluid overload   Other NRE: NONE   DID A NON-ROUTINE EVENT OCCUR? No           Last vitals:  Vitals Value Taken Time   /79 07/21/25 10:45   Temp 98.8  F (37.1  C) 07/21/25 10:45   Pulse 74 07/21/25 10:41   Resp 14 07/21/25 10:45   SpO2 98 % 07/21/25 10:45       Electronically Signed By: Delmi Vasquez MD  July 21, 2025  12:26 PM

## 2025-07-21 NOTE — ANESTHESIA PREPROCEDURE EVALUATION
Anesthesia Pre-Procedure Evaluation    Patient: Rand Macario   MRN: [id number] : 1970          Procedure : Procedure(s):  Both upper eyelid blepharoplasty and ptosis repair  Bilateral cosmetic lower eyelid blepharoplasty         Past Medical History:   Diagnosis Date    Cancer (H) 10/2024    Breast cancer    Diabetes mellitus, type 2 (H) 2024    GERD (gastroesophageal reflux disease) 2008    Headache 2008    High blood pressure associated with diabetes (H) 2024    Meningitis 2008    Obesity due to excess calories 10/31/2024      Past Surgical History:   Procedure Laterality Date    BREAST BIOPSY, RT/LT Left 10/09/2024    HYSTERECTOMY  2014    LUMPECTOMY BREAST WITH SENTINEL NODE, COMBINED Left 10/31/2024    L breast lumpectomy with L SLN bx Dr. Nahum Kaplan      No Known Allergies   Social History     Tobacco Use    Smoking status: Never    Smokeless tobacco: Never   Substance Use Topics    Alcohol use: Yes     Comment: Rarely drink alcohol      Wt Readings from Last 1 Encounters:   07/15/25 101.2 kg (223 lb 3.2 oz)        Anesthesia Evaluation   Pt has had prior anesthetic.     No history of anesthetic complications       ROS/MED HX  ENT/Pulmonary:     (+)     LAURIE risk factors, snores loudly, hypertension, obese,                                Neurologic:  - neg neurologic ROS     Cardiovascular:     (+)  hypertension- -   -  - -                                      METS/Exercise Tolerance: 4 - Raking leaves, gardening    Hematologic:  - neg hematologic  ROS     Musculoskeletal:  - neg musculoskeletal ROS     GI/Hepatic:     (+) GERD, Asymptomatic on medication,                  Renal/Genitourinary:  - neg Renal ROS     Endo:     (+) type I DM,              Obesity,       Psychiatric/Substance Use:  - neg psychiatric ROS     Infectious Disease:  - neg infectious disease ROS     Malignancy:   (+) Malignancy, History of Breast.Breast CA Remission status post Surgery and  "Radiation.      Other:              Physical Exam  Airway  Mallampati: II  TM distance: >3 FB  Neck ROM: full  Upper bite lip test: II  Mouth opening: >= 4 cm    Cardiovascular   Rhythm: regular  Rate: normal rate     Dental   (+) Multiple crowns, permanent bridges      Pulmonary - normal examBreath sounds clear to auscultation        Neurological - normal exam  She appears awake, alert and oriented x3.    Other Findings       OUTSIDE LABS:  CBC:   Lab Results   Component Value Date    WBC 7.3 11/22/2024    HGB 12.7 11/22/2024    HCT 40.5 11/22/2024     11/22/2024     BMP:   Lab Results   Component Value Date     11/22/2024    POTASSIUM 4.2 11/22/2024    CHLORIDE 102 11/22/2024    CO2 25 11/22/2024    BUN 12.2 11/22/2024    CR 0.66 11/22/2024    GLC 93 11/22/2024     COAGS: No results found for: \"PTT\", \"INR\", \"FIBR\"  POC: No results found for: \"BGM\", \"HCG\", \"HCGS\"  HEPATIC:   Lab Results   Component Value Date    ALBUMIN 4.6 11/22/2024    PROTTOTAL 7.5 11/22/2024    ALT 12 11/22/2024    AST 15 11/22/2024    ALKPHOS 112 11/22/2024    BILITOTAL 0.3 11/22/2024     OTHER:   Lab Results   Component Value Date    UMANG 9.9 11/22/2024       Anesthesia Plan    ASA Status:  3      NPO Status: NPO Appropriate   Anesthesia Type: MAC.  Airway: natural airway.   Techniques and Equipment:       - Monitoring Plan: standard ASA monitoring     Consents    Anesthesia Plan(s) and associated risks, benefits, and realistic alternatives discussed. Questions answered and patient/representative(s) expressed understanding.     - Discussed: CRNA, surgeon     - Discussed with:  Patient        - Pt is DNR/DNI Status: no DNR          Postoperative Care    Pain management: multimodal analgesia.     Comments:                   Delmi Vasquez MD    I have reviewed the pertinent notes and labs in the chart from the past 30 days and (re)examined the patient.  Any updates or changes from those notes are reflected in this " note.    Clinically Significant Risk Factors Present on Admission                   # Hypertension: Noted on problem list

## 2025-07-21 NOTE — ANESTHESIA CARE TRANSFER NOTE
Patient: Rand Macario    Procedure: Procedure(s):  Both upper eyelid blepharoplasty and ptosis repair  Bilateral cosmetic lower eyelid blepharoplasty       Diagnosis: Dermatochalasis of both upper eyelids [H02.831, H02.834]  Diagnosis Additional Information: No value filed.    Anesthesia Type:   MAC     Note:    Oropharynx: oropharynx clear of all foreign objects  Level of Consciousness: awake  Oxygen Supplementation: room air    Independent Airway: airway patency satisfactory and stable  Dentition: dentition unchanged  Vital Signs Stable: post-procedure vital signs reviewed and stable  Report to RN Given: handoff report given  Patient transferred to: Phase II  Comments: To Phase II. Report to RN.  VSS Resp status stable.  Handoff Report: Identifed the Patient, Identified the Reponsible Provider, Reviewed the pertinent medical history, Discussed the surgical course, Reviewed Intra-OP anesthesia mangement and issues during anesthesia, Set expectations for post-procedure period and Allowed opportunity for questions and acknowledgement of understanding    Vitals:  Vitals Value Taken Time   BP     Temp     Pulse     Resp     SpO2         Electronically Signed By: ANGEL Selby CRNA  July 21, 2025  10:24 AM

## 2025-07-21 NOTE — OP NOTE
PREOPERATIVE DIAGNOSES:   1. Bilateral upper eyelid dermatochalasis and ptosis.   2. Bilateral lower eyelid steatochalasis and dermatochalasis.   POSTOPERATIVE DIAGNOSES:   1. Bilateral upper eyelid dermatochalasis and ptosis.   2. Bilateral lower eyelid steatochalasis and dermatochalasis.   PROCEDURES:   1. Bilateral upper blepharoplasty  2. Both upper eyelid ptosis repair.   3. Bilateral lower eyelid cosmetic blepharoplasty.   SURGEON: Adele Myles  ASSISTANT: None  ANESTHESIA: Monitored with local infiltration of  1% lidocaine with epinephrine [id number] and 0.5% Marcaine.  COMPLICATIONS: None.   ESTIMATED BLOOD LOSS: Less than 5 mL.   HISTORY: Rand Macario  presented with upper lid drooping secondary to ptosis and dermatochalasis interfering with superior visual field and activities of daily living. Rand Macario also had lower lid dermatochalasis and steatochalasis and desired cosmetic correction. After risks, benefits and alternatives to the proposed procedure were explained, informed consent was obtained.   DESCRIPTION OF PROCEDURE: Rand Macario  was brought to the operating room and placed supine on the operating table. IV sedation was given. The upper lid crease and excess upper eyelid skin was marked with a marking pen and the upper and lower eyelids infiltrated with local anesthetic. She was prepped and draped in the typical sterile ophthalmic fashion. Attention was directed to the left side. Lower lid was everted. Conjunctival incision was made with monopolar cautery. The Omar lid plate was used to protect the globe during this procedure. A Desmarres retractor was placed and dissection carried down through the lower lid retractors. The conjunctiva and lower lid retractors were tagged with a 4-0 silk suture to the drape superiorly. Dissection was carried over each fat pad. Each fat pad was identified and resected with monopolar cautery. Hemostasis was obtained. The inferior oblique muscle was  identified and avoided. The silk suture was removed. Attention directed to the right lower lid where the same procedure was performed.     Attention was directed to the right upper lid. Skin was incised following marked lines. Skin and orbicularis muscle flap was excised with high temperature cautery. Orbital septum was opened horizontally and levator aponeurosis was dissected from the superior tarsal border and underlying Bhat's muscle and advanced to bring the lid into a normal height and contour with a 6-0 Vicryl. Nasal and central fat was conservatively debulked. Attention was directed to the left side where the same procedure was performed. The Vicryl sutures were adjusted for symmetry then tied in a permanent fashion. A double armed 5-0 Vicryl was passed through the lateral commissure, and externalized through the upper eyelid wound. This was secured to the periosteum of the superior lateral orbital rim on both sides. The upper eyelid incisions were closed with running 6-0 plain fast-absorbing gut suture. Erythromycin ointment was applied to the incision in the eyes. Steri strips were applied. The patient tolerated the procedure well and left the operating room in stable condition.   Adele Myles MD

## 2025-07-21 NOTE — DISCHARGE INSTRUCTIONS
Tylenol 975 mg was given at 0745AM.    You should not take more then 4,000 mg of tylenol/acetaminophen in a 24 hour period.   Post-operative Instructions  Ophthalmic Plastic and Reconstructive Surgery    Adele Myles M.D.     All instructions apply to the operated eye(s) or eyelid(s).    Wound care and personal care  ? Leave the steri-strips in place until they fall off. Once they fall off, you do not need to replace them.   ? Apply ice compresses and gentle pressure 15 minutes on, 15 minutes off, for 2 days. If you are sleeping, you don't need to wake up to ice. As long as there is no further bleeding, after two days, switch to warm water compresses for five minutes, four times a day until seen by your physician.   ? You may shower or wash your hair the day after surgery. Do not go swimming for at least 2 weeks to prevent contamination of your wounds.  ? Do not apply make-up to the eyes or eyelids for 2 weeks after surgery.  ? Expect some swelling, bruising, black eye (even into the lower eyelids and cheeks). Also expect serum caking, crusting and discharge from the eye and/or incisions. A small amount of surface bleeding, and depending on the type of surgery, bleeding from the inside of the eyelid, is normal for the first 48 hours. Vision can become blurry from blood tinged tears or drops and ointment in your eyes.   ? You may go for walks and light activity is ok, but no heavy (over 15 pounds) lifting, bending or excessive straining for one week.   ? Sleeping with your head elevated, such as in a recliner, for the first several days can help swelling resolve more quickly.   ? Do continue to ambulate (walk) as you normally would - being sedentary after surgery can cause blood clots.   ? Your eye(s) and eyelid(s) may be painful and tender. This is normal after surgery.      Contact information and follow-up  ? Return to the [facility] for a follow-up appointment with your physician as scheduled. If no  appointment has been scheduled:   - [phone] for an appointment with Dr. Myles within 2 to 3 weeks from your date of surgery.     ? For severe pain, bleeding, or loss of vision, call the [facility] at [phone].    After hours or on weekends and holidays, call [phone] and ask to speak with the ophthalmologist on call.    An on call person can be reached after hours for concerns. The on call doctor should not call in medication refill requests after hours or on weekends, so please plan accordingly.     Medications  ? Avoid Aspirin and ibuprofen for 3 days to reduce the risk of bleeding. You may take Tylenol (acetaminophen).  ? In addition to your home medications, take the following post-operative medications as prescribed by your physician:    ? Apply antibiotic ointment to all sutures three times a day, and into the operated eye(s) at night.   Once you run out, you can apply Vaseline or Aquaphor (over the counter) to the incisions. Don't put the Vaseline or Aquaphor into your eyes.   ? Instill prescribed eye drops 3 times a day for 10 days.   ? If you have ocular irritation, you can use over the counter artificial tears such as Refresh, Systane, or Blink. Do not use Visine, Clear Eyes, or any other drop that gets the red out.

## 2025-08-06 ENCOUNTER — OFFICE VISIT (OUTPATIENT)
Dept: OPHTHALMOLOGY | Facility: CLINIC | Age: 55
End: 2025-08-06
Payer: COMMERCIAL

## 2025-08-06 DIAGNOSIS — Z98.890 POSTOPERATIVE EYE STATE: Primary | ICD-10-CM

## 2025-08-06 ASSESSMENT — VISUAL ACUITY
METHOD: SNELLEN - LINEAR
OS_CC+: -1
CORRECTION_TYPE: CONTACTS
OD_CC: 20/20
OS_CC: 20/30

## 2025-08-06 ASSESSMENT — MARGIN REFLEX DISTANCE
OD_MRD1: 3
OS_MRD1: 3

## 2025-08-06 ASSESSMENT — SLIT LAMP EXAM - LIDS
COMMENTS: INCISIONS C/D/I
COMMENTS: INCISIONS C/D/I

## 2025-08-06 ASSESSMENT — TONOMETRY
OD_IOP_MMHG: 22
IOP_METHOD: ICARE
OS_IOP_MMHG: 22

## 2025-08-06 ASSESSMENT — LAGOPHTHALMOS
OD_LAGOPHTHALMOS: 0
OS_LAGOPHTHALMOS: 0

## 2025-08-09 ENCOUNTER — HEALTH MAINTENANCE LETTER (OUTPATIENT)
Age: 55
End: 2025-08-09

## 2025-08-18 ENCOUNTER — ONCOLOGY VISIT (OUTPATIENT)
Dept: ONCOLOGY | Facility: CLINIC | Age: 55
End: 2025-08-18
Attending: INTERNAL MEDICINE
Payer: COMMERCIAL

## 2025-08-18 VITALS
SYSTOLIC BLOOD PRESSURE: 125 MMHG | HEART RATE: 78 BPM | HEIGHT: 66 IN | BODY MASS INDEX: 35.86 KG/M2 | WEIGHT: 223.1 LBS | OXYGEN SATURATION: 99 % | DIASTOLIC BLOOD PRESSURE: 78 MMHG

## 2025-08-18 DIAGNOSIS — C50.212 MALIGNANT NEOPLASM OF UPPER-INNER QUADRANT OF LEFT BREAST IN FEMALE, ESTROGEN RECEPTOR POSITIVE (H): Primary | ICD-10-CM

## 2025-08-18 DIAGNOSIS — M81.8 OSTEOPOROSIS DUE TO AROMATASE INHIBITOR: ICD-10-CM

## 2025-08-18 DIAGNOSIS — Z17.0 MALIGNANT NEOPLASM OF UPPER-INNER QUADRANT OF LEFT BREAST IN FEMALE, ESTROGEN RECEPTOR POSITIVE (H): Primary | ICD-10-CM

## 2025-08-18 DIAGNOSIS — T38.6X5A OSTEOPOROSIS DUE TO AROMATASE INHIBITOR: ICD-10-CM

## 2025-08-18 PROCEDURE — G2211 COMPLEX E/M VISIT ADD ON: HCPCS | Performed by: INTERNAL MEDICINE

## 2025-08-18 PROCEDURE — 99213 OFFICE O/P EST LOW 20 MIN: CPT | Performed by: INTERNAL MEDICINE

## 2025-08-18 ASSESSMENT — PAIN SCALES - GENERAL: PAINLEVEL_OUTOF10: NO PAIN (0)

## (undated) DEVICE — SU VICRYL 6-0 P-1 18" UND J489G

## (undated) DEVICE — SOL WATER IRRIG 1000ML BOTTLE 07139-09

## (undated) DEVICE — PACK MINOR EYE

## (undated) DEVICE — SU PLAIN 6-0 G-1DA 18" 770G

## (undated) DEVICE — ESU PENCIL SMOKE EVAC W/ROCKER SWITCH 0703-047-000

## (undated) DEVICE — NDL 30GA 0.5" 305106

## (undated) DEVICE — GLOVE BIOGEL PI MICRO SZ 7.5 48575

## (undated) DEVICE — SYR 03ML LL W/O NDL

## (undated) DEVICE — NDL 30GA 1" 305128

## (undated) DEVICE — SU VICRYL 5-0 S-14 12" J553G

## (undated) RX ORDER — PROPOFOL 10 MG/ML
INJECTION, EMULSION INTRAVENOUS
Status: DISPENSED
Start: 2025-07-21

## (undated) RX ORDER — ACETAMINOPHEN 325 MG/1
TABLET ORAL
Status: DISPENSED
Start: 2025-07-21

## (undated) RX ORDER — FENTANYL CITRATE 50 UG/ML
INJECTION, SOLUTION INTRAMUSCULAR; INTRAVENOUS
Status: DISPENSED
Start: 2025-07-21